# Patient Record
Sex: MALE | Race: ASIAN | NOT HISPANIC OR LATINO | URBAN - METROPOLITAN AREA
[De-identification: names, ages, dates, MRNs, and addresses within clinical notes are randomized per-mention and may not be internally consistent; named-entity substitution may affect disease eponyms.]

---

## 2019-10-14 ENCOUNTER — EMERGENCY (EMERGENCY)
Facility: HOSPITAL | Age: 34
LOS: 1 days | Discharge: HOME | End: 2019-10-14
Attending: EMERGENCY MEDICINE | Admitting: EMERGENCY MEDICINE
Payer: MEDICAID

## 2019-10-14 VITALS
TEMPERATURE: 98 F | SYSTOLIC BLOOD PRESSURE: 116 MMHG | HEART RATE: 98 BPM | RESPIRATION RATE: 18 BRPM | OXYGEN SATURATION: 100 % | DIASTOLIC BLOOD PRESSURE: 79 MMHG

## 2019-10-14 LAB
ALBUMIN SERPL ELPH-MCNC: 4.5 G/DL — SIGNIFICANT CHANGE UP (ref 3.3–5)
ALP SERPL-CCNC: 50 U/L — SIGNIFICANT CHANGE UP (ref 40–120)
ALT FLD-CCNC: 13 U/L — SIGNIFICANT CHANGE UP (ref 4–41)
ANION GAP SERPL CALC-SCNC: 12 MMO/L — SIGNIFICANT CHANGE UP (ref 7–14)
APPEARANCE UR: CLEAR — SIGNIFICANT CHANGE UP
AST SERPL-CCNC: 18 U/L — SIGNIFICANT CHANGE UP (ref 4–40)
BASE EXCESS BLDV CALC-SCNC: 6.1 MMOL/L — SIGNIFICANT CHANGE UP
BASOPHILS # BLD AUTO: 0.04 K/UL — SIGNIFICANT CHANGE UP (ref 0–0.2)
BASOPHILS NFR BLD AUTO: 0.6 % — SIGNIFICANT CHANGE UP (ref 0–2)
BILIRUB SERPL-MCNC: 0.4 MG/DL — SIGNIFICANT CHANGE UP (ref 0.2–1.2)
BILIRUB UR-MCNC: NEGATIVE — SIGNIFICANT CHANGE UP
BLOOD GAS VENOUS - CREATININE: 0.92 MG/DL — SIGNIFICANT CHANGE UP (ref 0.5–1.3)
BLOOD UR QL VISUAL: NEGATIVE — SIGNIFICANT CHANGE UP
BUN SERPL-MCNC: 9 MG/DL — SIGNIFICANT CHANGE UP (ref 7–23)
CALCIUM SERPL-MCNC: 9.5 MG/DL — SIGNIFICANT CHANGE UP (ref 8.4–10.5)
CHLORIDE BLDV-SCNC: 105 MMOL/L — SIGNIFICANT CHANGE UP (ref 96–108)
CHLORIDE SERPL-SCNC: 100 MMOL/L — SIGNIFICANT CHANGE UP (ref 98–107)
CO2 SERPL-SCNC: 28 MMOL/L — SIGNIFICANT CHANGE UP (ref 22–31)
COLOR SPEC: COLORLESS — SIGNIFICANT CHANGE UP
CREAT SERPL-MCNC: 0.99 MG/DL — SIGNIFICANT CHANGE UP (ref 0.5–1.3)
EOSINOPHIL # BLD AUTO: 0.11 K/UL — SIGNIFICANT CHANGE UP (ref 0–0.5)
EOSINOPHIL NFR BLD AUTO: 1.7 % — SIGNIFICANT CHANGE UP (ref 0–6)
GAS PNL BLDV: 135 MMOL/L — LOW (ref 136–146)
GLUCOSE BLDV-MCNC: 89 MG/DL — SIGNIFICANT CHANGE UP (ref 70–99)
GLUCOSE SERPL-MCNC: 94 MG/DL — SIGNIFICANT CHANGE UP (ref 70–99)
GLUCOSE UR-MCNC: NEGATIVE — SIGNIFICANT CHANGE UP
HCO3 BLDV-SCNC: 26 MMOL/L — SIGNIFICANT CHANGE UP (ref 20–27)
HCT VFR BLD CALC: 45.4 % — SIGNIFICANT CHANGE UP (ref 39–50)
HCT VFR BLDV CALC: 53.4 % — HIGH (ref 39–51)
HGB BLD-MCNC: 16.4 G/DL — SIGNIFICANT CHANGE UP (ref 13–17)
HGB BLDV-MCNC: 17.5 G/DL — HIGH (ref 13–17)
IMM GRANULOCYTES NFR BLD AUTO: 0.2 % — SIGNIFICANT CHANGE UP (ref 0–1.5)
KETONES UR-MCNC: NEGATIVE — SIGNIFICANT CHANGE UP
LACTATE BLDV-MCNC: 1 MMOL/L — SIGNIFICANT CHANGE UP (ref 0.5–2)
LEUKOCYTE ESTERASE UR-ACNC: NEGATIVE — SIGNIFICANT CHANGE UP
LIDOCAIN IGE QN: 92.4 U/L — HIGH (ref 7–60)
LYMPHOCYTES # BLD AUTO: 1.58 K/UL — SIGNIFICANT CHANGE UP (ref 1–3.3)
LYMPHOCYTES # BLD AUTO: 24.6 % — SIGNIFICANT CHANGE UP (ref 13–44)
MCHC RBC-ENTMCNC: 31.2 PG — SIGNIFICANT CHANGE UP (ref 27–34)
MCHC RBC-ENTMCNC: 36.1 % — HIGH (ref 32–36)
MCV RBC AUTO: 86.3 FL — SIGNIFICANT CHANGE UP (ref 80–100)
MONOCYTES # BLD AUTO: 0.57 K/UL — SIGNIFICANT CHANGE UP (ref 0–0.9)
MONOCYTES NFR BLD AUTO: 8.9 % — SIGNIFICANT CHANGE UP (ref 2–14)
NEUTROPHILS # BLD AUTO: 4.11 K/UL — SIGNIFICANT CHANGE UP (ref 1.8–7.4)
NEUTROPHILS NFR BLD AUTO: 64 % — SIGNIFICANT CHANGE UP (ref 43–77)
NITRITE UR-MCNC: NEGATIVE — SIGNIFICANT CHANGE UP
NRBC # FLD: 0 K/UL — SIGNIFICANT CHANGE UP (ref 0–0)
PCO2 BLDV: 62 MMHG — HIGH (ref 41–51)
PH BLDV: 7.33 PH — SIGNIFICANT CHANGE UP (ref 7.32–7.43)
PH UR: 6.5 — SIGNIFICANT CHANGE UP (ref 5–8)
PLATELET # BLD AUTO: 216 K/UL — SIGNIFICANT CHANGE UP (ref 150–400)
PMV BLD: 10.8 FL — SIGNIFICANT CHANGE UP (ref 7–13)
PO2 BLDV: 25 MMHG — LOW (ref 35–40)
POTASSIUM BLDV-SCNC: 3.7 MMOL/L — SIGNIFICANT CHANGE UP (ref 3.4–4.5)
POTASSIUM SERPL-MCNC: 3.9 MMOL/L — SIGNIFICANT CHANGE UP (ref 3.5–5.3)
POTASSIUM SERPL-SCNC: 3.9 MMOL/L — SIGNIFICANT CHANGE UP (ref 3.5–5.3)
PROT SERPL-MCNC: 7.6 G/DL — SIGNIFICANT CHANGE UP (ref 6–8.3)
PROT UR-MCNC: NEGATIVE — SIGNIFICANT CHANGE UP
RBC # BLD: 5.26 M/UL — SIGNIFICANT CHANGE UP (ref 4.2–5.8)
RBC # FLD: 12 % — SIGNIFICANT CHANGE UP (ref 10.3–14.5)
SAO2 % BLDV: 38.9 % — LOW (ref 60–85)
SODIUM SERPL-SCNC: 140 MMOL/L — SIGNIFICANT CHANGE UP (ref 135–145)
SP GR SPEC: 1.01 — SIGNIFICANT CHANGE UP (ref 1–1.04)
UROBILINOGEN FLD QL: NORMAL — SIGNIFICANT CHANGE UP
WBC # BLD: 6.42 K/UL — SIGNIFICANT CHANGE UP (ref 3.8–10.5)
WBC # FLD AUTO: 6.42 K/UL — SIGNIFICANT CHANGE UP (ref 3.8–10.5)

## 2019-10-14 PROCEDURE — 99284 EMERGENCY DEPT VISIT MOD MDM: CPT

## 2019-10-14 PROCEDURE — 74177 CT ABD & PELVIS W/CONTRAST: CPT | Mod: 26

## 2019-10-14 RX ORDER — SODIUM CHLORIDE 9 MG/ML
1000 INJECTION INTRAMUSCULAR; INTRAVENOUS; SUBCUTANEOUS ONCE
Refills: 0 | Status: COMPLETED | OUTPATIENT
Start: 2019-10-14 | End: 2019-10-14

## 2019-10-14 RX ORDER — ACETAMINOPHEN 500 MG
650 TABLET ORAL ONCE
Refills: 0 | Status: COMPLETED | OUTPATIENT
Start: 2019-10-14 | End: 2019-10-14

## 2019-10-14 RX ADMIN — SODIUM CHLORIDE 1000 MILLILITER(S): 9 INJECTION INTRAMUSCULAR; INTRAVENOUS; SUBCUTANEOUS at 12:44

## 2019-10-14 RX ADMIN — Medication 650 MILLIGRAM(S): at 12:43

## 2019-10-14 NOTE — ED PROVIDER NOTE - PATIENT PORTAL LINK FT
You can access the FollowMyHealth Patient Portal offered by [facility] by registering at the following website: [email or web address]. By joining [employer]’s FollowMyHealth portal, you will also be able to view your health information using other applications (apps) compatible with our system.

## 2019-10-14 NOTE — ED PROVIDER NOTE - OBJECTIVE STATEMENT
Location:  [facility].  Duration: 2d.  Associated Sx:  no f/c.  Better/worse: no clear modifiers.    Context:  +MHx diverticulitis.

## 2019-10-14 NOTE — ED ADULT NURSE NOTE - OBJECTIVE STATEMENT
Pt presents to ED for left sided abdominal pain. pt AxOx3, ambulatory. Pt states the pain has been going on for 2 days. Pt denies nausea/vomiting/diarrhea. Pt denies dysuria and hematuria. 20G IVL placed in the L AC. meds given as per MD orders. Will continue to monitor.

## 2019-10-14 NOTE — ED PROVIDER NOTE - NSFOLLOWUPINSTRUCTIONS_ED_ALL_ED_FT
1. FOLLOW UP WITH YOUR PRIMARY MD.  2. YOU MAY TAKE IBUPROFEN (MOTRIN, ADVIL) OR ACETAMINOPHEN (TYLENOL) AS NEEDED, AS DIRECTED ON PACKAGING FOR PAIN OR FEVER.  3.  RETURN TO THE ER FOR ANY WORSENING SYMPTOMS OR CONCERNS (FEVER, CHILLS, WORSENING, PAIN, INABILITY TO EAT/DRINK, ETC...) 1. FOLLOW UP WITH YOUR PRIMARY MD.  2. YOU MAY TAKE IBUPROFEN (MOTRIN, ADVIL) AS NEEDED, AS DIRECTED ON PACKAGING FOR PAIN.  3.  TAKE PERCOCET (325/5mg) EVERY 6 HRS, AS NEEDED FOR PAIN.  4.  RETURN TO THE ER IMMEDIATELY FOR ANY WORSENING SYMPTOMS OR CONCERNS (FEVER, CHILLS, WORSENING, PAIN, INABILITY TO EAT/DRINK, ETC...)

## 2019-10-14 NOTE — ED PROVIDER NOTE - PROGRESS NOTE DETAILS
CT = epiploic appendagitis of the descending colon, which would account for his pain.  The intensity is not intractable.  Diagnosis explained to patient.  plan:  d/c home, nsaids, return precautions. CT = epiploic appendagitis of the descending colon, which would account for his pain.  The intensity is not intractable.  Diagnosis explained to patient.  plan:  d/c home, nsaids, Prn percocet, strict return precautions (fever, chills, nausea, intractable pain).

## 2019-10-14 NOTE — ED PROVIDER NOTE - CLINICAL SUMMARY MEDICAL DECISION MAKING FREE TEXT BOX
Impression/Plan:  Adult M with several days LLQ pain, MHx of diverticulitis and LLQ pain that is significant enough to warrant CT abdomen to r/o perf/abscess.  I do not suspect acute cholecystitis, ACS, or vascular etiology of his abdominal pain because the location, quality, and modifiers are inconsistent with these diagnoses.  Patient will be evaluated with labs, ua, CT abdomen, and treated with APAP and IVF (currently refusing stronger pain meds).

## 2019-10-17 ENCOUNTER — EMERGENCY (EMERGENCY)
Facility: HOSPITAL | Age: 34
LOS: 1 days | Discharge: HOME | End: 2019-10-17
Admitting: EMERGENCY MEDICINE
Payer: MEDICAID

## 2019-10-17 VITALS
DIASTOLIC BLOOD PRESSURE: 88 MMHG | TEMPERATURE: 99 F | HEART RATE: 93 BPM | OXYGEN SATURATION: 100 % | SYSTOLIC BLOOD PRESSURE: 133 MMHG | RESPIRATION RATE: 18 BRPM

## 2019-10-17 VITALS
HEART RATE: 83 BPM | DIASTOLIC BLOOD PRESSURE: 73 MMHG | RESPIRATION RATE: 17 BRPM | SYSTOLIC BLOOD PRESSURE: 114 MMHG | OXYGEN SATURATION: 99 % | TEMPERATURE: 99 F

## 2019-10-17 PROBLEM — K57.92 DIVERTICULITIS OF INTESTINE, PART UNSPECIFIED, WITHOUT PERFORATION OR ABSCESS WITHOUT BLEEDING: Chronic | Status: ACTIVE | Noted: 2019-10-14

## 2019-10-17 LAB
ALBUMIN SERPL ELPH-MCNC: 4.3 G/DL — SIGNIFICANT CHANGE UP (ref 3.3–5)
ALP SERPL-CCNC: 48 U/L — SIGNIFICANT CHANGE UP (ref 40–120)
ALT FLD-CCNC: 14 U/L — SIGNIFICANT CHANGE UP (ref 4–41)
ANION GAP SERPL CALC-SCNC: 8 MMO/L — SIGNIFICANT CHANGE UP (ref 7–14)
APPEARANCE UR: CLEAR — SIGNIFICANT CHANGE UP
AST SERPL-CCNC: 20 U/L — SIGNIFICANT CHANGE UP (ref 4–40)
BASE EXCESS BLDV CALC-SCNC: 4.9 MMOL/L — SIGNIFICANT CHANGE UP
BASOPHILS # BLD AUTO: 0.01 K/UL — SIGNIFICANT CHANGE UP (ref 0–0.2)
BASOPHILS NFR BLD AUTO: 0.2 % — SIGNIFICANT CHANGE UP (ref 0–2)
BILIRUB SERPL-MCNC: 0.3 MG/DL — SIGNIFICANT CHANGE UP (ref 0.2–1.2)
BILIRUB UR-MCNC: NEGATIVE — SIGNIFICANT CHANGE UP
BLOOD GAS VENOUS - CREATININE: 0.83 MG/DL — SIGNIFICANT CHANGE UP (ref 0.5–1.3)
BLOOD UR QL VISUAL: NEGATIVE — SIGNIFICANT CHANGE UP
BUN SERPL-MCNC: 9 MG/DL — SIGNIFICANT CHANGE UP (ref 7–23)
CALCIUM SERPL-MCNC: 9.4 MG/DL — SIGNIFICANT CHANGE UP (ref 8.4–10.5)
CHLORIDE BLDV-SCNC: 104 MMOL/L — SIGNIFICANT CHANGE UP (ref 96–108)
CHLORIDE SERPL-SCNC: 102 MMOL/L — SIGNIFICANT CHANGE UP (ref 98–107)
CO2 SERPL-SCNC: 28 MMOL/L — SIGNIFICANT CHANGE UP (ref 22–31)
COLOR SPEC: COLORLESS — SIGNIFICANT CHANGE UP
CREAT SERPL-MCNC: 0.87 MG/DL — SIGNIFICANT CHANGE UP (ref 0.5–1.3)
EOSINOPHIL # BLD AUTO: 0.04 K/UL — SIGNIFICANT CHANGE UP (ref 0–0.5)
EOSINOPHIL NFR BLD AUTO: 0.8 % — SIGNIFICANT CHANGE UP (ref 0–6)
GAS PNL BLDV: 137 MMOL/L — SIGNIFICANT CHANGE UP (ref 136–146)
GLUCOSE BLDV-MCNC: 96 MG/DL — SIGNIFICANT CHANGE UP (ref 70–99)
GLUCOSE SERPL-MCNC: 98 MG/DL — SIGNIFICANT CHANGE UP (ref 70–99)
GLUCOSE UR-MCNC: NEGATIVE — SIGNIFICANT CHANGE UP
HCO3 BLDV-SCNC: 27 MMOL/L — SIGNIFICANT CHANGE UP (ref 20–27)
HCT VFR BLD CALC: 46.5 % — SIGNIFICANT CHANGE UP (ref 39–50)
HCT VFR BLDV CALC: 50.5 % — SIGNIFICANT CHANGE UP (ref 39–51)
HGB BLD-MCNC: 16.5 G/DL — SIGNIFICANT CHANGE UP (ref 13–17)
HGB BLDV-MCNC: 16.5 G/DL — SIGNIFICANT CHANGE UP (ref 13–17)
IMM GRANULOCYTES NFR BLD AUTO: 0.2 % — SIGNIFICANT CHANGE UP (ref 0–1.5)
KETONES UR-MCNC: NEGATIVE — SIGNIFICANT CHANGE UP
LACTATE BLDV-MCNC: 1.7 MMOL/L — SIGNIFICANT CHANGE UP (ref 0.5–2)
LEUKOCYTE ESTERASE UR-ACNC: NEGATIVE — SIGNIFICANT CHANGE UP
LIDOCAIN IGE QN: 36.9 U/L — SIGNIFICANT CHANGE UP (ref 7–60)
LYMPHOCYTES # BLD AUTO: 1.05 K/UL — SIGNIFICANT CHANGE UP (ref 1–3.3)
LYMPHOCYTES # BLD AUTO: 20.4 % — SIGNIFICANT CHANGE UP (ref 13–44)
MCHC RBC-ENTMCNC: 30.6 PG — SIGNIFICANT CHANGE UP (ref 27–34)
MCHC RBC-ENTMCNC: 35.5 % — SIGNIFICANT CHANGE UP (ref 32–36)
MCV RBC AUTO: 86.3 FL — SIGNIFICANT CHANGE UP (ref 80–100)
MONOCYTES # BLD AUTO: 0.26 K/UL — SIGNIFICANT CHANGE UP (ref 0–0.9)
MONOCYTES NFR BLD AUTO: 5 % — SIGNIFICANT CHANGE UP (ref 2–14)
NEUTROPHILS # BLD AUTO: 3.78 K/UL — SIGNIFICANT CHANGE UP (ref 1.8–7.4)
NEUTROPHILS NFR BLD AUTO: 73.4 % — SIGNIFICANT CHANGE UP (ref 43–77)
NITRITE UR-MCNC: NEGATIVE — SIGNIFICANT CHANGE UP
NRBC # FLD: 0 K/UL — SIGNIFICANT CHANGE UP (ref 0–0)
PCO2 BLDV: 53 MMHG — HIGH (ref 41–51)
PH BLDV: 7.37 PH — SIGNIFICANT CHANGE UP (ref 7.32–7.43)
PH UR: 7.5 — SIGNIFICANT CHANGE UP (ref 5–8)
PLATELET # BLD AUTO: 193 K/UL — SIGNIFICANT CHANGE UP (ref 150–400)
PMV BLD: 11.1 FL — SIGNIFICANT CHANGE UP (ref 7–13)
PO2 BLDV: 36 MMHG — SIGNIFICANT CHANGE UP (ref 35–40)
POTASSIUM BLDV-SCNC: 3.6 MMOL/L — SIGNIFICANT CHANGE UP (ref 3.4–4.5)
POTASSIUM SERPL-MCNC: 3.9 MMOL/L — SIGNIFICANT CHANGE UP (ref 3.5–5.3)
POTASSIUM SERPL-SCNC: 3.9 MMOL/L — SIGNIFICANT CHANGE UP (ref 3.5–5.3)
PROT SERPL-MCNC: 7.5 G/DL — SIGNIFICANT CHANGE UP (ref 6–8.3)
PROT UR-MCNC: NEGATIVE — SIGNIFICANT CHANGE UP
RBC # BLD: 5.39 M/UL — SIGNIFICANT CHANGE UP (ref 4.2–5.8)
RBC # FLD: 11.9 % — SIGNIFICANT CHANGE UP (ref 10.3–14.5)
SAO2 % BLDV: 66 % — SIGNIFICANT CHANGE UP (ref 60–85)
SODIUM SERPL-SCNC: 138 MMOL/L — SIGNIFICANT CHANGE UP (ref 135–145)
SP GR SPEC: 1.01 — SIGNIFICANT CHANGE UP (ref 1–1.04)
UROBILINOGEN FLD QL: NORMAL — SIGNIFICANT CHANGE UP
WBC # BLD: 5.15 K/UL — SIGNIFICANT CHANGE UP (ref 3.8–10.5)
WBC # FLD AUTO: 5.15 K/UL — SIGNIFICANT CHANGE UP (ref 3.8–10.5)

## 2019-10-17 PROCEDURE — 99284 EMERGENCY DEPT VISIT MOD MDM: CPT

## 2019-10-17 RX ORDER — SODIUM CHLORIDE 9 MG/ML
1000 INJECTION INTRAMUSCULAR; INTRAVENOUS; SUBCUTANEOUS ONCE
Refills: 0 | Status: COMPLETED | OUTPATIENT
Start: 2019-10-17 | End: 2019-10-17

## 2019-10-17 RX ORDER — OXYCODONE AND ACETAMINOPHEN 5; 325 MG/1; MG/1
1 TABLET ORAL ONCE
Refills: 0 | Status: DISCONTINUED | OUTPATIENT
Start: 2019-10-17 | End: 2019-10-17

## 2019-10-17 RX ORDER — KETOROLAC TROMETHAMINE 30 MG/ML
30 SYRINGE (ML) INJECTION ONCE
Refills: 0 | Status: DISCONTINUED | OUTPATIENT
Start: 2019-10-17 | End: 2019-10-17

## 2019-10-17 RX ORDER — IBUPROFEN 200 MG
1 TABLET ORAL
Qty: 40 | Refills: 0
Start: 2019-10-17

## 2019-10-17 RX ORDER — LORATADINE 10 MG/1
1 TABLET ORAL
Qty: 30 | Refills: 0
Start: 2019-10-17

## 2019-10-17 RX ADMIN — SODIUM CHLORIDE 1000 MILLILITER(S): 9 INJECTION INTRAMUSCULAR; INTRAVENOUS; SUBCUTANEOUS at 14:51

## 2019-10-17 RX ADMIN — OXYCODONE AND ACETAMINOPHEN 1 TABLET(S): 5; 325 TABLET ORAL at 16:25

## 2019-10-17 RX ADMIN — Medication 30 MILLIGRAM(S): at 14:51

## 2019-10-17 NOTE — ED PROVIDER NOTE - PROGRESS NOTE DETAILS
PA smartt: on reassessment patient reported feeling better. he was advised on symptoms. return precautions were discussed with patient

## 2019-10-17 NOTE — ED PROVIDER NOTE - NSFOLLOWUPINSTRUCTIONS_ED_ALL_ED_FT
Please take medication as prescribed. If symptoms worsen or do not improve, fever, chills, nausea, vomiting  please return to ED.

## 2019-10-17 NOTE — ED PROVIDER NOTE - PATIENT PORTAL LINK FT
You can access the FollowMyHealth Patient Portal offered by [facility] by registering at the following website: [email or web address]. By joining [employer]’s FollowMyHealth portal, you will also be able to view your health information using other applications (apps) compatible with our system.

## 2019-10-17 NOTE — ED PROVIDER NOTE - OBJECTIVE STATEMENT
Patient is a 35 y/o M recently dx with epiplogic appendagitis of the descending colon 3 days ago returns to the ED with c/o continued pain and subjective fever. He states that pain has never completely resolved, that he has experienced pain since d/c but it increased in intensity today. No associated n/v/d/c, blood in stool, HA, dizziness, dysuria, hematuria.

## 2019-10-17 NOTE — ED PROVIDER NOTE - GASTROINTESTINAL, MLM
+ tenderness to the left flank/ lateral aspect of LUQ Abdominal soft, no rash, guarding, rigidness, rebound tenderness

## 2019-10-17 NOTE — ED ADULT NURSE NOTE - OBJECTIVE STATEMENT
break coverage RNL: pt received to intake #3 with c/o L flank and chills at home. pt warm to touch. denies urinary complaints, cp, sob, n/v/d. IV placed, labs drawn and sent. medication given as per MDs orders. pt to go to RW.

## 2019-10-17 NOTE — ED ADULT NURSE NOTE - NSIMPLEMENTINTERV_GEN_ALL_ED
Implemented All Universal Safety Interventions:  Guys to call system. Call bell, personal items and telephone within reach. Instruct patient to call for assistance. Room bathroom lighting operational. Non-slip footwear when patient is off stretcher. Physically safe environment: no spills, clutter or unnecessary equipment. Stretcher in lowest position, wheels locked, appropriate side rails in place.

## 2019-10-17 NOTE — ED ADULT NURSE REASSESSMENT NOTE - COMFORT CARE
instructions given not to drive or drink alcohol after receiving percocet, states he will get friend to drive him home

## 2019-10-17 NOTE — ED PROVIDER NOTE - CLINICAL SUMMARY MEDICAL DECISION MAKING FREE TEXT BOX
35 y/o M recently diagnosed with epiploic appendagitis returns to the ED with worsening left flank pain and subjective fever chills. patient afebrile at triage. PE unremarkable for surgical abdomen. plan is for routine labs, analgesic and reassessment

## 2019-10-18 LAB
SPECIMEN SOURCE: SIGNIFICANT CHANGE UP
SPECIMEN SOURCE: SIGNIFICANT CHANGE UP

## 2019-10-18 NOTE — ED POST DISCHARGE NOTE - RESULT SUMMARY
US Tape review- educational ultrasound reviewed.  Gallstone vs gallbladder polyp seen; no sign of cholecystitis.  Pt contacted at [phone] and informed as such, advised to f/u with PMD and arrange f/u US in 6 mos.  Pt provided read back confirmation of understanding.

## 2019-10-22 LAB
BACTERIA BLD CULT: SIGNIFICANT CHANGE UP
BACTERIA BLD CULT: SIGNIFICANT CHANGE UP

## 2020-03-24 ENCOUNTER — EMERGENCY (EMERGENCY)
Facility: HOSPITAL | Age: 35
LOS: 1 days | Discharge: HOME | End: 2020-03-24
Attending: EMERGENCY MEDICINE | Admitting: EMERGENCY MEDICINE
Payer: MEDICAID

## 2020-03-24 VITALS
SYSTOLIC BLOOD PRESSURE: 143 MMHG | WEIGHT: 167.99 LBS | OXYGEN SATURATION: 98 % | DIASTOLIC BLOOD PRESSURE: 87 MMHG | TEMPERATURE: 98 F | HEART RATE: 104 BPM | RESPIRATION RATE: 16 BRPM

## 2020-03-24 VITALS
HEART RATE: 92 BPM | SYSTOLIC BLOOD PRESSURE: 116 MMHG | OXYGEN SATURATION: 100 % | DIASTOLIC BLOOD PRESSURE: 62 MMHG | TEMPERATURE: 99 F | RESPIRATION RATE: 18 BRPM

## 2020-03-24 DIAGNOSIS — B34.9 VIRAL INFECTION, UNSPECIFIED: ICD-10-CM

## 2020-03-24 DIAGNOSIS — Z20.828 CONTACT WITH AND (SUSPECTED) EXPOSURE TO OTHER VIRAL COMMUNICABLE DISEASES: ICD-10-CM

## 2020-03-24 DIAGNOSIS — R05 COUGH: ICD-10-CM

## 2020-03-24 LAB — RAPID RVP RESULT: SIGNIFICANT CHANGE UP

## 2020-03-24 PROCEDURE — 71046 X-RAY EXAM CHEST 2 VIEWS: CPT | Mod: 26

## 2020-03-24 PROCEDURE — 99284 EMERGENCY DEPT VISIT MOD MDM: CPT

## 2020-03-24 RX ORDER — ALBUTEROL 90 UG/1
2 AEROSOL, METERED ORAL ONCE
Refills: 0 | Status: COMPLETED | OUTPATIENT
Start: 2020-03-24 | End: 2020-03-24

## 2020-03-24 RX ORDER — ACETAMINOPHEN 500 MG
2 TABLET ORAL
Qty: 30 | Refills: 0
Start: 2020-03-24

## 2020-03-24 RX ORDER — ALBUTEROL 90 UG/1
2 AEROSOL, METERED ORAL
Qty: 1 | Refills: 0
Start: 2020-03-24 | End: 2020-04-22

## 2020-03-24 RX ORDER — AZITHROMYCIN 500 MG/1
1 TABLET, FILM COATED ORAL
Qty: 6 | Refills: 0
Start: 2020-03-24 | End: 2020-03-28

## 2020-03-24 RX ORDER — ACETAMINOPHEN 500 MG
975 TABLET ORAL ONCE
Refills: 0 | Status: COMPLETED | OUTPATIENT
Start: 2020-03-24 | End: 2020-03-24

## 2020-03-24 RX ADMIN — Medication 975 MILLIGRAM(S): at 16:21

## 2020-03-24 RX ADMIN — ALBUTEROL 2 PUFF(S): 90 AEROSOL, METERED ORAL at 16:22

## 2020-03-24 RX ADMIN — Medication 0.25 MILLIGRAM(S): at 16:22

## 2020-03-24 NOTE — ED PROVIDER NOTE - RESPIRATORY, MLM
Breath sounds clear and equal bilaterally. Slightly decreased air entry bilaterally with expiratory wheezing associated with cough. Slightly decreased air entry bilaterally with expiratory wheezing associated with cough.

## 2020-03-24 NOTE — ED PROVIDER NOTE - CLINICAL SUMMARY MEDICAL DECISION MAKING FREE TEXT BOX
Pt presenting with symptoms consistent with COVID-19. Seems stable for discharge, has wheezing. Will check CXR and treat with azithromycin, albuterol, and terbutaline. Spoke with patient regarding COVID testing. Patient verbally agreed to receiving results electronically. Signature not done 2/2 infection risk. Pt presenting with symptoms consistent with COVID-19. Seems stable for discharge, but has wheezing on exam. Will check CXR and treat with Azithromycin, Albuterol, and Terbutaline. Spoke with patient regarding COVID testing. Patient verbally agreed to receiving results electronically. Signature not done 2/2 infection risk.

## 2020-03-24 NOTE — ED PROVIDER NOTE - NSDCPRINTRESULTS_ED_ALL_ED
Resp. monitored floor bed
Patient requests all Lab and Radiology Results on their Discharge Instructions

## 2020-03-24 NOTE — ED PROVIDER NOTE - NSFOLLOWUPINSTRUCTIONS_ED_ALL_ED_FT
You likely have Coronavirus, as well as asthma triggered by the infection.     COVID-19 testing are currently being prioritized at [facility] for admitted patients.     All patients that are stable are being discharged from the ED, even if there is a concern for coronavirus. Since you are stable, you are being discharged. Your test results may take 5-7 days. You will get a text message or email for positive results. Not for negative results.  Please check the patient online portal for results. Please follow the instructions on provided coronavirus discharge educational forms and self quarantine for 14 days.     In addition, you have been placed on our surveillance tracker. Return to the ED immediately if you have shortness of breath, fever, pain, weakness, vomiting any concerns.    1. STAY HOME for 14 DAYS  2. Minimize Human contact to ONLY ESSENTIAL  3. Every time you wash your hands, sing the HAPPY BIRTHDAY Song so you know you're washing long enough.  Make sure to scrub the webspace between your fingers.  4. DRINK 1-3 Liters of fluids day x at least 5 days.  To remain hydrated. Your fatigue, lightheadedness, and body aches will decrease and your fever has a better chance of breaking if you are well hydrated.    5. For your Fever and Body aches takes Tylenol 650-100mg every 4-6h (max 4000mg/day). Try not to use ibuprofen, aspirin or naproxen (Advil, Motrin or Aleve) as these may worsen Coronavirus infection.  6. Use an inhaler for mild shortness of breath and cough  7. RETURN TO THE ER IMMEDIATELY IF YOU HAVE WORSENING SHORTNESS OF BREATH

## 2020-03-24 NOTE — ED ADULT TRIAGE NOTE - CHIEF COMPLAINT QUOTE
Patient c/p chest pressure, mild cough and feeling feverish. Pt room was exposed to someone who tested positive for COVID-19

## 2020-03-24 NOTE — ED PROVIDER NOTE - OBJECTIVE STATEMENT
34 y/o male with PMHx of asthma presents to ED c/o cough x4-5 days, diarrhea and fever x1 day, and difficulty breathing. Pt reports he took Tylenol this morning and had contact with someone who tested + for COVID-19. 34 y/o male with PMHx of asthma presents to ED c/o cough x 4-5 days. Patient reports diarrhea yesterday as well as fever. Also endorses difficulty breathing. Denies any CP, abd pain, nausea, or vomiting. Reports contact with someone who tested + for COVID-19. Patient has been taking Tylenol (last dose this morning).

## 2020-03-24 NOTE — ED PROVIDER NOTE - PATIENT PORTAL LINK FT
You can access the FollowMyHealth Patient Portal offered by [facility] by registering at the following website: [email or web address]. By joining [employer]’s FollowMyHealth portal, you will also be able to view your health information using other applications (apps) compatible with our system.

## 2020-03-25 LAB — SARS-COV-2 RNA SPEC QL NAA+PROBE: DETECTED

## 2020-05-01 ENCOUNTER — APPOINTMENT (OUTPATIENT)
Dept: DISASTER EMERGENCY | Facility: CLINIC | Age: 35
End: 2020-05-01
Payer: COMMERCIAL

## 2020-05-01 VITALS
TEMPERATURE: 98.8 F | RESPIRATION RATE: 14 BRPM | SYSTOLIC BLOOD PRESSURE: 110 MMHG | HEART RATE: 90 BPM | OXYGEN SATURATION: 98 % | DIASTOLIC BLOOD PRESSURE: 68 MMHG

## 2020-05-01 VITALS — HEIGHT: 70 IN | BODY MASS INDEX: 24.05 KG/M2 | WEIGHT: 168 LBS

## 2020-05-01 DIAGNOSIS — Z20.828 CONTACT WITH AND (SUSPECTED) EXPOSURE TO OTHER VIRAL COMMUNICABLE DISEASES: ICD-10-CM

## 2020-05-01 DIAGNOSIS — U07.1 COVID-19: ICD-10-CM

## 2020-05-01 DIAGNOSIS — Z87.09 PERSONAL HISTORY OF OTHER DISEASES OF THE RESPIRATORY SYSTEM: ICD-10-CM

## 2020-05-01 PROBLEM — Z00.00 ENCOUNTER FOR PREVENTIVE HEALTH EXAMINATION: Status: ACTIVE | Noted: 2020-05-01

## 2020-05-01 PROBLEM — J45.909 UNSPECIFIED ASTHMA, UNCOMPLICATED: Chronic | Status: ACTIVE | Noted: 2020-03-24

## 2020-05-01 PROCEDURE — 99213 OFFICE O/P EST LOW 20 MIN: CPT

## 2020-05-01 NOTE — HISTORY OF PRESENT ILLNESS
[Patient presents to the office today for COVID-19 evaluation and testing.] : Patient presents to the office today for COVID-19 evaluation and testing. [Patient has been pre-screened by RN at call center for appointment today with our facility.] : Patient has been pre-screened by RN at call center for appointment today with our facility. 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, Fire Dept, or Police [Lung Disease] : lung disease [None] : none [Clear] : clear [Good Air Entry] : good air entry [Speaks in full sentences] : speaks in full sentences [Normal O2 sat at rest] : normal O2 sat at rest [Grossly normal, interacts, not tired or weak] : grossly normal, interacts, not tired or weak [COVID-19 testing ordered and specimen obtained] : COVID-19 testing ordered and specimen obtained [Discharged with current Quarantine instructions and advised of signs of worsening illness.] : Patient discharged with current quarantine instructions and advised of signs of worsening illness. Patient told to seek emergent care if symptoms occur. [FreeTextEntry1] : Katie is a 35 year old male with complaints of fever (TMax 100.5 F), chills, headache, chest tightness and nausea x 1 week. He was seen at a [facility] and started on two antibiotics (patient unable to recall medications).  His roommate has been sick but was not tested for COVID.  He works for [employer].   [TextBox_107] : \par Patient education provided for COVID-19. Explained if symptoms such as SOB or fever progress, patient instructed to go to ED.  Discussed isolation precautions and handwashing techniques. Social distancing reviewed. Utilize Tylenol for fever >101. No signs of cardiovascular decompensation. Patient verbalized understanding of provided instructions. Tests results may take up to 3-7 days to result. Discussed possibility of false negative results. Instructed to self quarantine and must remain quarantined x 14 days and no fever for three days without antipyretic medication.  All patient close contacts should also self quarantine. Social distancing once quarantine is completed. If patient has symptoms of chest discomfort, severe SOB at rest, worsening shortness of breath with minimal exertion, new or worsening wheezing, dizziness were instructed to seek immediate medical evaluation. \par \par COVID-19 nasal swab preformed and ordered in office today. Advised test results may take 3-7 days to return. Discussed diagnostic accuracy and possibility of false negative results. Instructed to self quarantine and must remain quarantined x 14 days and no fever for 3 days without antifever medications.  All patients close contacts should also self quarantine.  Close contacts with comorbidities at higher risk of COVID disease.  Social distancing once quarantine is completed.  If high risk symptoms of chest discomfort, severe shortness of breath at rest, worsening shortness of breath with minimal exertion, new or worsening wheezing, dizziness then instructed to seek immediate medical evaluation.  A letter for work and patient education form was provided.  The above plan was reviewed with the patient.  All questions have been answered.  Instructed to call if any new symptoms\par  \par \par \par

## 2020-05-02 LAB — SARS-COV-2 N GENE NPH QL NAA+PROBE: NOT DETECTED

## 2020-05-11 ENCOUNTER — EMERGENCY (EMERGENCY)
Facility: HOSPITAL | Age: 35
LOS: 1 days | Discharge: HOME | End: 2020-05-11
Attending: EMERGENCY MEDICINE | Admitting: EMERGENCY MEDICINE
Payer: MEDICAID

## 2020-05-11 VITALS
RESPIRATION RATE: 16 BRPM | OXYGEN SATURATION: 100 % | SYSTOLIC BLOOD PRESSURE: 127 MMHG | DIASTOLIC BLOOD PRESSURE: 80 MMHG | TEMPERATURE: 99 F | HEART RATE: 97 BPM

## 2020-05-11 VITALS
RESPIRATION RATE: 20 BRPM | TEMPERATURE: 99 F | SYSTOLIC BLOOD PRESSURE: 132 MMHG | HEART RATE: 90 BPM | OXYGEN SATURATION: 100 % | DIASTOLIC BLOOD PRESSURE: 87 MMHG

## 2020-05-11 LAB
ALBUMIN SERPL ELPH-MCNC: 4.6 G/DL — SIGNIFICANT CHANGE UP (ref 3.3–5)
ALP SERPL-CCNC: 49 U/L — SIGNIFICANT CHANGE UP (ref 40–120)
ALT FLD-CCNC: 25 U/L — SIGNIFICANT CHANGE UP (ref 4–41)
ANION GAP SERPL CALC-SCNC: 12 MMO/L — SIGNIFICANT CHANGE UP (ref 7–14)
APPEARANCE UR: CLEAR — SIGNIFICANT CHANGE UP
APTT BLD: 30.1 SEC — SIGNIFICANT CHANGE UP (ref 27.5–36.3)
AST SERPL-CCNC: 26 U/L — SIGNIFICANT CHANGE UP (ref 4–40)
BASE EXCESS BLDV CALC-SCNC: 2.6 MMOL/L — SIGNIFICANT CHANGE UP
BASOPHILS # BLD AUTO: 0.01 K/UL — SIGNIFICANT CHANGE UP (ref 0–0.2)
BASOPHILS NFR BLD AUTO: 0.2 % — SIGNIFICANT CHANGE UP (ref 0–2)
BILIRUB SERPL-MCNC: 0.3 MG/DL — SIGNIFICANT CHANGE UP (ref 0.2–1.2)
BILIRUB UR-MCNC: NEGATIVE — SIGNIFICANT CHANGE UP
BLOOD GAS VENOUS - CREATININE: 0.94 MG/DL — SIGNIFICANT CHANGE UP (ref 0.5–1.3)
BLOOD GAS VENOUS - FIO2: 21 — SIGNIFICANT CHANGE UP
BLOOD UR QL VISUAL: NEGATIVE — SIGNIFICANT CHANGE UP
BUN SERPL-MCNC: 12 MG/DL — SIGNIFICANT CHANGE UP (ref 7–23)
CALCIUM SERPL-MCNC: 9.7 MG/DL — SIGNIFICANT CHANGE UP (ref 8.4–10.5)
CHLORIDE BLDV-SCNC: 105 MMOL/L — SIGNIFICANT CHANGE UP (ref 96–108)
CHLORIDE SERPL-SCNC: 102 MMOL/L — SIGNIFICANT CHANGE UP (ref 98–107)
CO2 SERPL-SCNC: 26 MMOL/L — SIGNIFICANT CHANGE UP (ref 22–31)
COLOR SPEC: COLORLESS — SIGNIFICANT CHANGE UP
CREAT SERPL-MCNC: 0.93 MG/DL — SIGNIFICANT CHANGE UP (ref 0.5–1.3)
EOSINOPHIL # BLD AUTO: 0.13 K/UL — SIGNIFICANT CHANGE UP (ref 0–0.5)
EOSINOPHIL NFR BLD AUTO: 2.1 % — SIGNIFICANT CHANGE UP (ref 0–6)
GAS PNL BLDV: 140 MMOL/L — SIGNIFICANT CHANGE UP (ref 136–146)
GLUCOSE BLDV-MCNC: 135 MG/DL — HIGH (ref 70–99)
GLUCOSE SERPL-MCNC: 130 MG/DL — HIGH (ref 70–99)
GLUCOSE UR-MCNC: NEGATIVE — SIGNIFICANT CHANGE UP
HCO3 BLDV-SCNC: 25 MMOL/L — SIGNIFICANT CHANGE UP (ref 20–27)
HCT VFR BLD CALC: 46.2 % — SIGNIFICANT CHANGE UP (ref 39–50)
HCT VFR BLDV CALC: 51.5 % — HIGH (ref 39–51)
HGB BLD-MCNC: 16.6 G/DL — SIGNIFICANT CHANGE UP (ref 13–17)
HGB BLDV-MCNC: 16.8 G/DL — SIGNIFICANT CHANGE UP (ref 13–17)
IMM GRANULOCYTES NFR BLD AUTO: 0.5 % — SIGNIFICANT CHANGE UP (ref 0–1.5)
INR BLD: 0.98 — SIGNIFICANT CHANGE UP (ref 0.88–1.17)
KETONES UR-MCNC: NEGATIVE — SIGNIFICANT CHANGE UP
LACTATE BLDV-MCNC: 2 MMOL/L — SIGNIFICANT CHANGE UP (ref 0.5–2)
LEUKOCYTE ESTERASE UR-ACNC: NEGATIVE — SIGNIFICANT CHANGE UP
LYMPHOCYTES # BLD AUTO: 1.55 K/UL — SIGNIFICANT CHANGE UP (ref 1–3.3)
LYMPHOCYTES # BLD AUTO: 24.6 % — SIGNIFICANT CHANGE UP (ref 13–44)
MAGNESIUM SERPL-MCNC: 1.9 MG/DL — SIGNIFICANT CHANGE UP (ref 1.6–2.6)
MCHC RBC-ENTMCNC: 31.1 PG — SIGNIFICANT CHANGE UP (ref 27–34)
MCHC RBC-ENTMCNC: 35.9 % — SIGNIFICANT CHANGE UP (ref 32–36)
MCV RBC AUTO: 86.7 FL — SIGNIFICANT CHANGE UP (ref 80–100)
MONOCYTES # BLD AUTO: 0.25 K/UL — SIGNIFICANT CHANGE UP (ref 0–0.9)
MONOCYTES NFR BLD AUTO: 4 % — SIGNIFICANT CHANGE UP (ref 2–14)
NEUTROPHILS # BLD AUTO: 4.32 K/UL — SIGNIFICANT CHANGE UP (ref 1.8–7.4)
NEUTROPHILS NFR BLD AUTO: 68.6 % — SIGNIFICANT CHANGE UP (ref 43–77)
NITRITE UR-MCNC: NEGATIVE — SIGNIFICANT CHANGE UP
NRBC # FLD: 0 K/UL — SIGNIFICANT CHANGE UP (ref 0–0)
PCO2 BLDV: 51 MMHG — SIGNIFICANT CHANGE UP (ref 41–51)
PH BLDV: 7.35 PH — SIGNIFICANT CHANGE UP (ref 7.32–7.43)
PH UR: 6.5 — SIGNIFICANT CHANGE UP (ref 5–8)
PHOSPHATE SERPL-MCNC: 2.6 MG/DL — SIGNIFICANT CHANGE UP (ref 2.5–4.5)
PLATELET # BLD AUTO: 236 K/UL — SIGNIFICANT CHANGE UP (ref 150–400)
PMV BLD: 11.4 FL — SIGNIFICANT CHANGE UP (ref 7–13)
PO2 BLDV: 40 MMHG — SIGNIFICANT CHANGE UP (ref 35–40)
POTASSIUM BLDV-SCNC: 3.9 MMOL/L — SIGNIFICANT CHANGE UP (ref 3.4–4.5)
POTASSIUM SERPL-MCNC: 4 MMOL/L — SIGNIFICANT CHANGE UP (ref 3.5–5.3)
POTASSIUM SERPL-SCNC: 4 MMOL/L — SIGNIFICANT CHANGE UP (ref 3.5–5.3)
PROT SERPL-MCNC: 7.9 G/DL — SIGNIFICANT CHANGE UP (ref 6–8.3)
PROT UR-MCNC: NEGATIVE — SIGNIFICANT CHANGE UP
PROTHROM AB SERPL-ACNC: 11.3 SEC — SIGNIFICANT CHANGE UP (ref 9.8–13.1)
RBC # BLD: 5.33 M/UL — SIGNIFICANT CHANGE UP (ref 4.2–5.8)
RBC # FLD: 12.1 % — SIGNIFICANT CHANGE UP (ref 10.3–14.5)
SAO2 % BLDV: 70.3 % — SIGNIFICANT CHANGE UP (ref 60–85)
SODIUM SERPL-SCNC: 140 MMOL/L — SIGNIFICANT CHANGE UP (ref 135–145)
SP GR SPEC: 1 — LOW (ref 1–1.04)
UROBILINOGEN FLD QL: NORMAL — SIGNIFICANT CHANGE UP
WBC # BLD: 6.29 K/UL — SIGNIFICANT CHANGE UP (ref 3.8–10.5)
WBC # FLD AUTO: 6.29 K/UL — SIGNIFICANT CHANGE UP (ref 3.8–10.5)

## 2020-05-11 PROCEDURE — 99284 EMERGENCY DEPT VISIT MOD MDM: CPT

## 2020-05-11 PROCEDURE — 74177 CT ABD & PELVIS W/CONTRAST: CPT | Mod: 26

## 2020-05-11 RX ORDER — IBUPROFEN 200 MG
400 TABLET ORAL ONCE
Refills: 0 | Status: COMPLETED | OUTPATIENT
Start: 2020-05-11 | End: 2020-05-11

## 2020-05-11 RX ORDER — MULTIVIT WITH MIN/MFOLATE/K2 340-15/3 G
1 POWDER (GRAM) ORAL ONCE
Refills: 0 | Status: COMPLETED | OUTPATIENT
Start: 2020-05-11 | End: 2020-05-11

## 2020-05-11 RX ADMIN — Medication 1 BOTTLE: at 14:04

## 2020-05-11 RX ADMIN — Medication 400 MILLIGRAM(S): at 09:08

## 2020-05-11 RX ADMIN — Medication 30 MILLILITER(S): at 13:36

## 2020-05-11 NOTE — ED PROVIDER NOTE - PROGRESS NOTE DETAILS
Saw EM/IM PGY2: CT with no emergent findings. Pt reports pain improved however "rumbling" of LUQ persists. Has not had BM for 2 days. Given mag citrate and will d/c with strict return precautions and GI follow-up. Pt verbalized understanding of and agree with these results and plan.

## 2020-05-11 NOTE — ED PROVIDER NOTE - NS ED ROS FT
Constitutional: no fever and no chills  Eyes: no discharge, no irritation, no pain, no visual changes  ENMT: no ear pain or hearing loss, no dysphagia or throat pain  Neck: no pain, no stiffness, no swollen glands  CV: no chest pain, no palpitations, no edema  Resp: no cough, no shortness of breath  Abd: (+) abdominal pain, (+) nausea, no vomiting, (+) diarrhea  : (+) dysuria, no hematuria  MSK: no back pain, no neck pain, no joint pain  Neuro: no LOC, no gait abnormality, no headache, no sensory deficits, no weakness  Skin: no rashes, no lacerations, no lesions

## 2020-05-11 NOTE — ED PROVIDER NOTE - PHYSICAL EXAMINATION
PHYSICAL EXAM:  GENERAL: Sitting in stretcher appears uncomfortable 2/2 pain, in no acute distress  HENMT: Atraumatic, moist mucous membranes, no oropharyngeal exudates or vesicles, uvula is midline  EYES: Clear bilaterally, PERRL, EOMs intact b/l  HEART: RRR, S1/S2, no murmurs noted  RESPIRATORY: Clear to auscultation bilaterally, no wheezes/rhonchi/rales  ABDOMEN: LLQ tenderness to deep palpation, otherwise soft, nondistended, no rebound or guarding  EXTREMITIES: No lower extremity edema, +2 radial pulses b/l  NEURO:  A&Ox4, no focal motor deficits or sensory deficits   Heme/LYMPH: No ecchymosis or bruising, no anterior/posterior cervical or supraclavicular LAD  SKIN:  Skin normal color for race, warm, dry and intact. No evidence of rash.

## 2020-05-11 NOTE — ED ADULT NURSE NOTE - OBJECTIVE STATEMENT
pt received at intake rm 1 AAO x 3. pt reports left sided abdominal pain x 1 week associated with nausea, diarrhea and constipation. pt denies sob, chest pain, vomiting, fevers, chills, cough. respirations even and unlabored. 20g iv placed to left ac. labs drawn and sent.

## 2020-05-11 NOTE — ED PROVIDER NOTE - OBJECTIVE STATEMENT
34 y/o M with PMH epiplogic appendagitis, asthma presenting with 1 week of gradual onset waxing and waning L-sided abd/LLQ pain which has become constant pain in the last 2 days a/w intermittent RLQ pain. Associated with nausea, no vomiting, and 1 week of intermittent dysuria. Also 1 week of intermittent diarrhea intermixed with constipation, yesterday 4-5 episodes of watery brown diarrhea without blood, constipated this AM. Denies any fevers/chills, chest pain, SOB, cough, hematuria, joint pain/swelling, or rashes. Notes he was dx with COVID-19 in March, with repeat negative swabs recently, was treated with course of azithromycin/cefpodoxime which he completed yesterday for pna seen on CXR. Hx of epiploic appendagitis ~2 years ago treated with supportive mgmt, no surgical intervention at that time.

## 2020-05-11 NOTE — ED ADULT TRIAGE NOTE - CHIEF COMPLAINT QUOTE
Pt comes in for LLQ pain and flank pain x1 week, worsening in the last two days. Pt reporting being constipated as well with small BM this AM. Pt appears uncomfortable in triage, vs as noted.

## 2020-05-11 NOTE — ED PROVIDER NOTE - CLINICAL SUMMARY MEDICAL DECISION MAKING FREE TEXT BOX
34 y/o M with PMH epiplogic appendagitis, asthma presenting with 1 week of L-sided abd/LLQ pain a/w nausea, dysuria, diarrhea. LLQ tenderness to palpation. Afebrile, vital signs stable. Presentation concerning for recurrence of epiploic appendagitis v diverticulitis, will obtain basic labs, VBG, coags, CT A/P w/ PO/IV contrast, tylenol, no nausea currently, and reevaluate. 36 y/o M with PMH epiplogic appendagitis, asthma presenting with 1 week of L-sided abd/LLQ pain a/w nausea, dysuria, diarrhea. LLQ tenderness to palpation. Afebrile, vital signs stable. Presentation concerning for recurrence of epiploic appendagitis v diverticulitis, will obtain basic labs, VBG, coags, CT A/P w/ PO/IV contrast, tylenol, no nausea currently, and reevaluate.    deidra: pt with left upper abd pain, recent hx of epiploic appendagitis.  no r/g, abd soft, no zoster lesions, ct no findings.  could be epiploic appendigitis but also could be his gastritis (on omeprazole) or stool load.  will refer to outpt GI for f/u.  discussed need for endoscopy

## 2020-05-11 NOTE — ED PROVIDER NOTE - NSFOLLOWUPINSTRUCTIONS_ED_ALL_ED_FT
You were seen in the ER for abdominal pain. We obtained labwork and urine test that showed no evidence of infection. A CT scan of your abdomen showed no evidence of diverticulitis, epiploic appendagitis, or other emergent findings that would explain your symptoms. It is possible that your pain is due to an inflammatory or infectious process that is not present on imaging, but this is less likely given your labwork and imaging results. It is also possible your symptoms are due to constipation as you have not had a bowel movement in two days. We gave you a dose of magnesium citrate in the ER to help you have a bowel movement. You can also take miralax once per day (available over the counter) until you have regular bowel movement. You have also been given a referral for gastroenterology who you should follow-up with within the next week for repeat evaluation and to discuss further possible diagnostic tests if your symptoms persist. Return to the ER for any fevers/chills, severe or worsening abdominal pain, vomiting/inability to eat, lightheadedness/fainting, or any other new or concerning symptoms.

## 2020-05-11 NOTE — ED PROVIDER NOTE - ATTENDING CONTRIBUTION TO CARE
deidra: pt with left upper abd pain, recent hx of epiploic appendagitis.  no r/g, abd soft, no zoster lesions, ct no findings.  could be epiploic appendigitis but also could be his gastritis (on omeprazole) or stool load.  will refer to outpt GI for f/u.  discussed need for endoscopy    I performed a history and physical exam of the patient and discussed their management with the resident and /or advanced care provider. I reviewed the resident and /or ACP's note and agree with the documented findings and plan of care. My medical decison making and observations are found above.    I have supervised and agree with the above resident history, physical and plan with the noted differences.

## 2020-05-11 NOTE — ED PROVIDER NOTE - CARE PLAN
Principal Discharge DX:	Epiploic appendagitis  Secondary Diagnosis:	Left upper quadrant abdominal pain

## 2020-05-11 NOTE — ED PROVIDER NOTE - PATIENT PORTAL LINK FT
You can access the FollowMyHealth Patient Portal offered by [facility] by registering at the following website: [email or web address]. By joining [employer]’s FollowMyHealth portal, you will also be able to view your health information using other applications (apps) compatible with our system.

## 2020-05-11 NOTE — ED PROVIDER NOTE - NSFOLLOWUPCLINICS_GEN_ALL_ED_FT
[facility]  Gastroenterology  [address] [place] [id number]  Phone: [phone]  Fax:   Follow Up Time: 4-6 Days

## 2020-06-18 PROBLEM — U07.1 COVID-19: Status: ACTIVE | Noted: 2020-05-01

## 2021-03-24 NOTE — ED ADULT NURSE NOTE - NS ED NOTE ABUSE SUSPICION NEGLECT YN
Confirmed, thank you so much    
Dr. Negrete patient stopped in office today to schedule vein procedures, in reviewing chart we had attempted to contact her after office visit in December to schedule vein procedures, patient did not return calls, in reviewing chart last vein mapping was completed February 2020 so now is over a year old, repeat vein mapping ordered for patient and scheduled  
No

## 2021-06-28 ENCOUNTER — TRANSCRIPTION ENCOUNTER (OUTPATIENT)
Age: 36
End: 2021-06-28

## 2021-06-28 ENCOUNTER — EMERGENCY (EMERGENCY)
Facility: HOSPITAL | Age: 36
LOS: 1 days | Discharge: HOME | End: 2021-06-28
Attending: EMERGENCY MEDICINE | Admitting: EMERGENCY MEDICINE
Payer: MEDICAID

## 2021-06-28 VITALS
TEMPERATURE: 98 F | HEART RATE: 85 BPM | OXYGEN SATURATION: 99 % | SYSTOLIC BLOOD PRESSURE: 128 MMHG | RESPIRATION RATE: 17 BRPM | DIASTOLIC BLOOD PRESSURE: 88 MMHG

## 2021-06-28 DIAGNOSIS — Z98.890 OTHER SPECIFIED POSTPROCEDURAL STATES: Chronic | ICD-10-CM

## 2021-06-28 LAB
ALBUMIN SERPL ELPH-MCNC: 4.4 G/DL — SIGNIFICANT CHANGE UP (ref 3.3–5)
ALP SERPL-CCNC: 55 U/L — SIGNIFICANT CHANGE UP (ref 40–120)
ALT FLD-CCNC: 28 U/L — SIGNIFICANT CHANGE UP (ref 4–41)
ANION GAP SERPL CALC-SCNC: 14 MMOL/L — SIGNIFICANT CHANGE UP (ref 7–14)
APPEARANCE UR: ABNORMAL
AST SERPL-CCNC: 39 U/L — SIGNIFICANT CHANGE UP (ref 4–40)
BASOPHILS # BLD AUTO: 0.02 K/UL — SIGNIFICANT CHANGE UP (ref 0–0.2)
BASOPHILS NFR BLD AUTO: 0.3 % — SIGNIFICANT CHANGE UP (ref 0–2)
BILIRUB SERPL-MCNC: 0.4 MG/DL — SIGNIFICANT CHANGE UP (ref 0.2–1.2)
BILIRUB UR-MCNC: NEGATIVE — SIGNIFICANT CHANGE UP
BUN SERPL-MCNC: 12 MG/DL — SIGNIFICANT CHANGE UP (ref 7–23)
CALCIUM SERPL-MCNC: 9.4 MG/DL — SIGNIFICANT CHANGE UP (ref 8.4–10.5)
CHLORIDE SERPL-SCNC: 101 MMOL/L — SIGNIFICANT CHANGE UP (ref 98–107)
CO2 SERPL-SCNC: 23 MMOL/L — SIGNIFICANT CHANGE UP (ref 22–31)
COLOR SPEC: YELLOW — SIGNIFICANT CHANGE UP
CREAT SERPL-MCNC: 1.03 MG/DL — SIGNIFICANT CHANGE UP (ref 0.5–1.3)
DIFF PNL FLD: NEGATIVE — SIGNIFICANT CHANGE UP
EOSINOPHIL # BLD AUTO: 0.1 K/UL — SIGNIFICANT CHANGE UP (ref 0–0.5)
EOSINOPHIL NFR BLD AUTO: 1.3 % — SIGNIFICANT CHANGE UP (ref 0–6)
GLUCOSE SERPL-MCNC: 95 MG/DL — SIGNIFICANT CHANGE UP (ref 70–99)
GLUCOSE UR QL: NEGATIVE — SIGNIFICANT CHANGE UP
HCT VFR BLD CALC: 47.7 % — SIGNIFICANT CHANGE UP (ref 39–50)
HGB BLD-MCNC: 16.2 G/DL — SIGNIFICANT CHANGE UP (ref 13–17)
IANC: 4.96 K/UL — SIGNIFICANT CHANGE UP (ref 1.5–8.5)
IMM GRANULOCYTES NFR BLD AUTO: 0.3 % — SIGNIFICANT CHANGE UP (ref 0–1.5)
KETONES UR-MCNC: NEGATIVE — SIGNIFICANT CHANGE UP
LEUKOCYTE ESTERASE UR-ACNC: NEGATIVE — SIGNIFICANT CHANGE UP
LIDOCAIN IGE QN: 62 U/L — HIGH (ref 7–60)
LYMPHOCYTES # BLD AUTO: 2.07 K/UL — SIGNIFICANT CHANGE UP (ref 1–3.3)
LYMPHOCYTES # BLD AUTO: 27.2 % — SIGNIFICANT CHANGE UP (ref 13–44)
MCHC RBC-ENTMCNC: 29.9 PG — SIGNIFICANT CHANGE UP (ref 27–34)
MCHC RBC-ENTMCNC: 34 GM/DL — SIGNIFICANT CHANGE UP (ref 32–36)
MCV RBC AUTO: 88 FL — SIGNIFICANT CHANGE UP (ref 80–100)
MONOCYTES # BLD AUTO: 0.43 K/UL — SIGNIFICANT CHANGE UP (ref 0–0.9)
MONOCYTES NFR BLD AUTO: 5.7 % — SIGNIFICANT CHANGE UP (ref 2–14)
NEUTROPHILS # BLD AUTO: 4.96 K/UL — SIGNIFICANT CHANGE UP (ref 1.8–7.4)
NEUTROPHILS NFR BLD AUTO: 65.2 % — SIGNIFICANT CHANGE UP (ref 43–77)
NITRITE UR-MCNC: NEGATIVE — SIGNIFICANT CHANGE UP
NRBC # BLD: 0 /100 WBCS — SIGNIFICANT CHANGE UP
NRBC # FLD: 0 K/UL — SIGNIFICANT CHANGE UP
PH UR: 6.5 — SIGNIFICANT CHANGE UP (ref 5–8)
PLATELET # BLD AUTO: 202 K/UL — SIGNIFICANT CHANGE UP (ref 150–400)
POTASSIUM SERPL-MCNC: 4.8 MMOL/L — SIGNIFICANT CHANGE UP (ref 3.5–5.3)
POTASSIUM SERPL-SCNC: 4.8 MMOL/L — SIGNIFICANT CHANGE UP (ref 3.5–5.3)
PROT SERPL-MCNC: 7.4 G/DL — SIGNIFICANT CHANGE UP (ref 6–8.3)
PROT UR-MCNC: NEGATIVE — SIGNIFICANT CHANGE UP
RBC # BLD: 5.42 M/UL — SIGNIFICANT CHANGE UP (ref 4.2–5.8)
RBC # FLD: 12.1 % — SIGNIFICANT CHANGE UP (ref 10.3–14.5)
SODIUM SERPL-SCNC: 138 MMOL/L — SIGNIFICANT CHANGE UP (ref 135–145)
SP GR SPEC: 1.01 — LOW (ref 1.01–1.02)
UROBILINOGEN FLD QL: SIGNIFICANT CHANGE UP
WBC # BLD: 7.6 K/UL — SIGNIFICANT CHANGE UP (ref 3.8–10.5)
WBC # FLD AUTO: 7.6 K/UL — SIGNIFICANT CHANGE UP (ref 3.8–10.5)

## 2021-06-28 PROCEDURE — 74177 CT ABD & PELVIS W/CONTRAST: CPT | Mod: 26

## 2021-06-28 PROCEDURE — 99284 EMERGENCY DEPT VISIT MOD MDM: CPT

## 2021-06-28 RX ORDER — SODIUM CHLORIDE 9 MG/ML
1000 INJECTION INTRAMUSCULAR; INTRAVENOUS; SUBCUTANEOUS ONCE
Refills: 0 | Status: COMPLETED | OUTPATIENT
Start: 2021-06-28 | End: 2021-06-28

## 2021-06-28 RX ADMIN — SODIUM CHLORIDE 1000 MILLILITER(S): 9 INJECTION INTRAMUSCULAR; INTRAVENOUS; SUBCUTANEOUS at 14:46

## 2021-06-28 NOTE — ED PROVIDER NOTE - PHYSICAL EXAMINATION
Gen: Awake, Alert, WD, WN, NAD  Head:  NC/AT  Eyes:  PERRL, EOMI, Conjunctiva pink, lids normal, no scleral icterus  Neck: supple, nontender, no meningismus, no JVD, trachea midline  Cardiac/CV:  S1 S2, RRR, no M/G/R  Respiratory/Pulm:  CTAB, good air movement, normal resp effort, no wheezes/stridor/retractions/rales/rhonchi  Gastrointestinal/Abdomen:  Soft, distended, no rebound/guarding, TTP LLQ  Back:  no CVAT, no MLT  Ext:  warm, well perfused, moving all extremities spontaneously, no peripheral edema, distal pulses intact  Skin: intact, no rash  Neuro:  AAOx3, sensation intact, motor 5/5 x 4 extremities, speech clear

## 2021-06-28 NOTE — ED PROVIDER NOTE - NSFOLLOWUPINSTRUCTIONS_ED_ALL_ED_FT
You have been seen in the ED today for abdominal discomfort. Your labs, urine, and CT scan today are normal.     Please follow up with your GI (gastroenterologist) doctor as soon as possible for further management.     If you develop the inability to eat or drink, difficulty breathing, chest pain, blood in your stool or urine, fevers or chills, or any other concerning symptoms please return to the ED.

## 2021-06-28 NOTE — ED PROVIDER NOTE - OBJECTIVE STATEMENT
37yo w/PMH c.diff and diverticulitis, p/w lower abdominal pain (L>R) progressively worsening over 2 weeks, a/w hematuria and abdominal distension. Pt denies fever/chills, cp/sob, n/v/d, melena/hematochezia, urinary frequency, or further symptoms. Last BM was today, not passing gas per patient. Patient states he was treated for c.diff 1 month ago with metronidazole, and followed up with a GI doctor who performed endoscopy after which he was placed on omeprazole. Patient has not taken any other medications for his symptoms. No events at onset. Patient states he feels abdominal distension worst after eating.

## 2021-06-28 NOTE — ED PROVIDER NOTE - CLINICAL SUMMARY MEDICAL DECISION MAKING FREE TEXT BOX
Pt p/w lower abdominal pain, worst LLQ in context of history of cdiff and diverticulitis. Pt also endorses hematuria, adding nephrolithiasis to the ddx. Hx of inguinal hernia repair, PBO vs. SBO possible. PE significant for abdominal distension and LLQ pain. labs, ua, CT pending. Dispo per workup. Has GI f/u.

## 2021-06-28 NOTE — ED PROVIDER NOTE - ATTENDING CONTRIBUTION TO CARE
DR. GOMEZ, ATTENDING MD-  I performed a face to face bedside interview with the patient regarding history of present illness, review of symptoms and past medical history. I completed an independent physical exam.  I have discussed the patient's plan of care with the fellow.  Documentation as above in the note.    35 y/o male h/o diverticulitis, cdiff, epiploic appendagitis here with abd pain, hematuria.  Eval for diverticulitis, uti.  Obtain cbc cmp ua ucx ct a/p reassess.

## 2021-06-28 NOTE — ED ADULT NURSE NOTE - OBJECTIVE STATEMENT
pt received at intake rm 4 AAo x 3. pt with hx of diverticulitis and c.diff. pt c/o lower abdominal pain x 1 week associated with hematuria and burning while urinating. pt denies sob, chest pain, n/v/d, fevers, chills, cough. respirations even and unlabored. 20g iv placed to left ac. will continue to monitor.

## 2021-06-28 NOTE — ED PROVIDER NOTE - PATIENT PORTAL LINK FT
You can access the FollowMyHealth Patient Portal offered by [facility] by registering at the following website: [email or web address]. By joining [employer]’s FollowMyHealth portal, you will also be able to view your health information using other applications (apps) compatible with our system.

## 2021-06-28 NOTE — ED PROVIDER NOTE - PROGRESS NOTE DETAILS
Discussed results with patient. Advised GI f/u for abdominal distension. Patient mentioned his ankles have been swollen in the AM, but resolve throughout the day. Advised pt to f/u w/PCP for these symptoms (denies CP/SOB, palpitations or near syncope). Pt also endorsing that his hematuria is intermittent (not observed in ED today). Advised patient to follow up with outpatient urology and gave handout for referral.

## 2022-02-09 NOTE — ED PROVIDER NOTE - CROS ED NEURO ALL NEG
Implemented All Universal Safety Interventions:  [place] to call system. Call bell, personal items and telephone within reach. Instruct patient to call for assistance. Room bathroom lighting operational. Non-slip footwear when patient is off stretcher. Physically safe environment: no spills, clutter or unnecessary equipment. Stretcher in lowest position, wheels locked, appropriate side rails in place.
negative...

## 2022-10-23 ENCOUNTER — EMERGENCY (EMERGENCY)
Facility: HOSPITAL | Age: 37
LOS: 1 days | Discharge: HOME | End: 2022-10-23
Attending: EMERGENCY MEDICINE | Admitting: EMERGENCY MEDICINE

## 2022-10-23 VITALS
RESPIRATION RATE: 16 BRPM | HEART RATE: 82 BPM | SYSTOLIC BLOOD PRESSURE: 124 MMHG | TEMPERATURE: 97 F | DIASTOLIC BLOOD PRESSURE: 83 MMHG | OXYGEN SATURATION: 99 %

## 2022-10-23 DIAGNOSIS — Z98.890 OTHER SPECIFIED POSTPROCEDURAL STATES: Chronic | ICD-10-CM

## 2022-10-23 PROBLEM — A04.72 ENTEROCOLITIS DUE TO CLOSTRIDIUM DIFFICILE, NOT SPECIFIED AS RECURRENT: Chronic | Status: ACTIVE | Noted: 2021-06-28

## 2022-10-23 LAB
ALBUMIN SERPL ELPH-MCNC: 4.3 G/DL — SIGNIFICANT CHANGE UP (ref 3.3–5)
ALP SERPL-CCNC: 55 U/L — SIGNIFICANT CHANGE UP (ref 40–120)
ALT FLD-CCNC: 14 U/L — SIGNIFICANT CHANGE UP (ref 4–41)
ANION GAP SERPL CALC-SCNC: 9 MMOL/L — SIGNIFICANT CHANGE UP (ref 7–14)
AST SERPL-CCNC: 14 U/L — SIGNIFICANT CHANGE UP (ref 4–40)
BILIRUB SERPL-MCNC: 0.4 MG/DL — SIGNIFICANT CHANGE UP (ref 0.2–1.2)
BUN SERPL-MCNC: 13 MG/DL — SIGNIFICANT CHANGE UP (ref 7–23)
CALCIUM SERPL-MCNC: 9.7 MG/DL — SIGNIFICANT CHANGE UP (ref 8.4–10.5)
CHLORIDE SERPL-SCNC: 99 MMOL/L — SIGNIFICANT CHANGE UP (ref 98–107)
CO2 SERPL-SCNC: 28 MMOL/L — SIGNIFICANT CHANGE UP (ref 22–31)
CREAT SERPL-MCNC: 0.94 MG/DL — SIGNIFICANT CHANGE UP (ref 0.5–1.3)
EGFR: 108 ML/MIN/1.73M2 — SIGNIFICANT CHANGE UP
GLUCOSE SERPL-MCNC: 99 MG/DL — SIGNIFICANT CHANGE UP (ref 70–99)
HCT VFR BLD CALC: 44.7 % — SIGNIFICANT CHANGE UP (ref 39–50)
HGB BLD-MCNC: 15.9 G/DL — SIGNIFICANT CHANGE UP (ref 13–17)
MCHC RBC-ENTMCNC: 29.9 PG — SIGNIFICANT CHANGE UP (ref 27–34)
MCHC RBC-ENTMCNC: 35.6 GM/DL — SIGNIFICANT CHANGE UP (ref 32–36)
MCV RBC AUTO: 84.2 FL — SIGNIFICANT CHANGE UP (ref 80–100)
NRBC # BLD: 0 /100 WBCS — SIGNIFICANT CHANGE UP (ref 0–0)
NRBC # FLD: 0 K/UL — SIGNIFICANT CHANGE UP (ref 0–0)
PLATELET # BLD AUTO: 240 K/UL — SIGNIFICANT CHANGE UP (ref 150–400)
POTASSIUM SERPL-MCNC: 3.6 MMOL/L — SIGNIFICANT CHANGE UP (ref 3.5–5.3)
POTASSIUM SERPL-SCNC: 3.6 MMOL/L — SIGNIFICANT CHANGE UP (ref 3.5–5.3)
PROT SERPL-MCNC: 7.5 G/DL — SIGNIFICANT CHANGE UP (ref 6–8.3)
RBC # BLD: 5.31 M/UL — SIGNIFICANT CHANGE UP (ref 4.2–5.8)
RBC # FLD: 12.4 % — SIGNIFICANT CHANGE UP (ref 10.3–14.5)
SARS-COV-2 RNA SPEC QL NAA+PROBE: SIGNIFICANT CHANGE UP
SODIUM SERPL-SCNC: 136 MMOL/L — SIGNIFICANT CHANGE UP (ref 135–145)
TROPONIN T, HIGH SENSITIVITY RESULT: <6 NG/L — SIGNIFICANT CHANGE UP
WBC # BLD: 6.01 K/UL — SIGNIFICANT CHANGE UP (ref 3.8–10.5)
WBC # FLD AUTO: 6.01 K/UL — SIGNIFICANT CHANGE UP (ref 3.8–10.5)

## 2022-10-23 PROCEDURE — 99285 EMERGENCY DEPT VISIT HI MDM: CPT

## 2022-10-23 PROCEDURE — 71046 X-RAY EXAM CHEST 2 VIEWS: CPT | Mod: 26

## 2022-10-23 PROCEDURE — 93010 ELECTROCARDIOGRAM REPORT: CPT

## 2022-10-23 NOTE — ED PROVIDER NOTE - NSFOLLOWUPINSTRUCTIONS_ED_ALL_ED_FT
Chest Pain    Chest pain can be caused by many different conditions which may or may not be dangerous. Causes include heartburn, lung infections, heart attack, blood clot in lungs, skin infections, strain or damage to muscle, cartilage, or bones, etc. In addition to a history and physical examination, an electrocardiogram (ECG) or other lab tests may have been performed to determine the cause of your chest pain. Follow up with your primary care provider or with a cardiologist as instructed.     SEEK IMMEDIATE MEDICAL CARE IF YOU HAVE ANY OF THE FOLLOWING SYMPTOMS: worsening chest pain, coughing up blood, unexplained back/neck/jaw pain, severe abdominal pain, dizziness or lightheadedness, fainting, shortness of breath, sweaty or clammy skin, vomiting, or racing heart beat. These symptoms may represent a serious problem that is an emergency. Do not wait to see if the symptoms will go away. Get medical help right away. Call 911 and do not drive yourself to the hospital.     Follow up with your cardiologist for a stress test and echocardiogram.

## 2022-10-23 NOTE — ED PROVIDER NOTE - OBJECTIVE STATEMENT
37 year old male asthma hx with 2 months of left chest and shoulder pain that has worsened over past 2 days. no trauma, no fever no cough, mild sob. no leg swelling. pain is constant, non exertional, achey. took no meds.  no prior stress or echo.  Mom with MI in 50's. non smoker.

## 2022-10-23 NOTE — ED PROVIDER NOTE - CLINICAL SUMMARY MEDICAL DECISION MAKING FREE TEXT BOX
concern for acs vs msk pain vs pna vs pleural effusion vs ptx.  well's score below testing threshold for pe.  trop ekg, cbc cmp for electrolyte abn.  will offer cardiac testing if workup neg

## 2022-10-23 NOTE — ED ADULT NURSE NOTE - OBJECTIVE STATEMENT
Received pt to intake 5, A+Ox4, ambulatory. C/O left sided chest pain radiating to the left shoulder, upper back. Respirations even and unlabored, normal work of breathing, no accessory muscle use, speaking in full clear uninterrupted sentences. ABD is soft, non tender, non distended. Pt denies any SOB, headache, dizziness, N/V/D, fever, chills. . 20G to LAC, Labs sent, will continue to monitor.

## 2023-03-15 NOTE — ED ADULT NURSE NOTE - NSSEPSISSUSPECTED_ED_A_ED
Pt. received from previous nurse in stable condition,  at bedside advocating for pt.  No s/sx of resp. distress/pain.  O2 in place via NC.  Safety maintained, call bell within reach, will continue to monitor.
Purposeful rounding done. PT assisted by assigned RN and myself with repositioning and placed in gown. Pt awaiting bed assignment. No other questions or needs at this time. Call bell in reach.
No

## 2023-05-09 NOTE — ED PROVIDER NOTE - DISPOSITION TYPE
Chief Complaint   Patient presents with    Elbow Pain     left       History obtained from mother.    HPI: Neli Nathan is a 15 y.o. child here for evaluation of left elbow pain that started 2 weeks ago.  Patient states it was her left elbow and left forearm and it worsened when she bent her elbow or twisted her hand.  She went to urgent care and x-rays were negative.  She was advised that if it did not resolve in 2 weeks to return for re-evaluation so she is here.  It has improved greatly.  No numbness or tingling.  She denies any injury.      Review of Systems   Musculoskeletal:  Positive for myalgias.   Neurological:  Negative for tingling, sensory change and weakness.      Current Outpatient Medications on File Prior to Visit   Medication Sig Dispense Refill    ofloxacin (FLOXIN) 0.3 % otic solution Place into the right ear.       No current facility-administered medications on file prior to visit.       Patient Active Problem List   Diagnosis    Bite, insect    Infected insect bite    Allergic reaction to insect bite    Congenital pes planovalgus    Allergic rhinitis    Mandibular hyperplasia            Past Medical History:   Diagnosis Date    Anxiety     PTSD (post-traumatic stress disorder)      Past Surgical History:   Procedure Laterality Date    ADENOIDECTOMY      TYMPANOSTOMY TUBE PLACEMENT        Social History     Social History Narrative    Going to [employer] [facility].No pets. Lives with mom and step dad, 2 brothers (7, and 7 yrs old).      Family History   Problem Relation Age of Onset    No Known Problems Mother     No Known Problems Father     No Known Problems Sister     Asthma Brother     No Known Problems Maternal Grandmother     No Known Problems Maternal Grandfather     No Known Problems Paternal Grandmother     No Known Problems Paternal Grandfather           EXAM:  Vitals:    05/08/23 1415   Resp: 20   Temp: 98.6 °F (37 °C)     Temp 98.6 °F (37 °C) (Oral)   Resp 20   Wt  68.7 kg (151 lb 7.3 oz)   LMP 04/17/2023   General appearance: alert, appears stated age, and cooperative  Extremities: extremities normal, atraumatic, no cyanosis or edema        IMPRESSION  1. Left forearm pain            PLAN  Neli was seen today for elbow pain.    Diagnoses and all orders for this visit:    Left forearm pain    Tylenol or ibuprofen as directed.  Ice area as needed.  Rest.  Avoid any turning or twisting movements of left hand.  Notify clinic if symptoms do not improve in the next 2 weeks.   DISCHARGE

## 2023-09-28 NOTE — ED PROVIDER NOTE - ALLERGIC/IMMUNOLOGIC NEGATIVE STATEMENT, MLM
no dermatitis, no environmental allergies, no food allergies, no immunosuppressive disorder, and no pruritus. Protopic Pregnancy And Lactation Text: This medication is Pregnancy Category C. It is unknown if this medication is excreted in breast milk when applied topically.

## 2024-08-05 NOTE — ED PROVIDER NOTE - CROS ED RESP ALL NEG
"  Assessment & Plan     ICD-10-CM    1. Syncope, unspecified syncope type  R55 Magnesium     CBC with platelets and differential      2. Need for hepatitis C screening test  Z11.59       3. Pancreatic lesion  K86.9       4. Primary hypertension  I10 Comprehensive metabolic panel (BMP + Alb, Alk Phos, ALT, AST, Total. Bili, TP)      5. Hypokalemia  E87.6 Comprehensive metabolic panel (BMP + Alb, Alk Phos, ALT, AST, Total. Bili, TP)      6. Low magnesium level  R79.0 magnesium 250 MG tablet      7. Alcohol use  Z78.9 thiamine (B-1) 100 MG tablet     Folate        Patient is here today for follow-up of emergency room visit for a fall.  She has considerable bruising all over.  Reviewed labs and workup done in emergency room.  Will check electrolytes again.  May need to start on supplement.  Needs close follow-up.      MED REC REQUIRED  Post Medication Reconciliation Status:  Discharge medications reconciled and changed, see notes/orders  BMI  Estimated body mass index is 36.6 kg/m  as calculated from the following:    Height as of this encounter: 1.626 m (5' 4\").    Weight as of this encounter: 96.7 kg (213 lb 3.2 oz).         MEDICATIONS:   Orders Placed This Encounter   Medications    thiamine (B-1) 100 MG tablet     Sig: Take 1 tablet (100 mg) by mouth daily     Dispense:  100 tablet     Refill:  3    magnesium 250 MG tablet     Sig: Take 1 tablet (250 mg) by mouth daily     Dispense:  100 tablet     Refill:  1          - Continue other medications without change  Patient Instructions   I have seen you today for follow-up of emergency room visit for syncope.    We are taking your blood count, electrolytes including potassium and magnesium today.    Noting you have a large red blood cell size, regular alcohol use, I am starting you on thiamine and folic acid.  I strongly advised her to cut down on alcohol slowly to not more than 1 drink a day and not more than 7 in 1 week.  These are general guidelines.    Noting low " magnesium, start magnesium supplement prescription is sent to the pharmacy.  It may not be covered with insurance and you may need to buy OTC.    Other issues that need follow-up-    Neurology appointment.  Imaging studies for follow-up of pancreatic lesion/cyst    Follow-up with your PCP regularly so that multiple issues can be followed.    Total time spent including chart review, patient interview, discussion of plan of care, examination and documentation exceeded 40 minutes.      Tien Bradley is a 78 year old, presenting for the following health issues:  Hospital F/U ([facility] follow up- fall. )        8/5/2024     8:52 AM   Additional Questions   Roomed by Sara Saenz CMA   Accompanied by Spouse     History of Present Illness       Heart Failure:  She presents for follow up of heart failure. She is not experiencing shortness of breath at night, with rest or with activity  She is experiencing lower extremity edema which is worse than usual.   She has orthopenea and is not coughing at night. Patient is checking weight daily. She has recently had a weight decrease.  She has no side effects from medications.  She has has a medical visit for heart failure 1 time since the last visit.    She eats 2-3 servings of fruits and vegetables daily.She exercises with enough effort to increase her heart rate 9 or less minutes per day.  She exercises with enough effort to increase her heart rate 3 or less days per week.      Last Echo:   Echo result w/o MOPS: Interpretation Summary 1. Normal left ventricular size and systolic performance with a visuallyestimated ejection fraction of 65%.2. No significant valvular heart disease is identified on this study.3. Normal right ventricular size and systolic performance.4. There is mild left atrial enlargement. When compared to the prior real-time echocardiogram dated 12 May 2024, thepreviously identified small pericardial effusion is no longer appreciated. Thefindings are  otherwise felt to be fairly similar on both examinations.        Hospital Follow-up Visit:    Hospital/Nursing Home/IP Rehab Facility: [facility]  Date of Admission: 07/31/2024  Date of Discharge: 08/02/2024  Reason(s) for Admission: Fall  Was the patient in the ICU or did the patient experience delirium during hospitalization?  No  Do you have any other stressors you would like to discuss with your provider? No    Problems taking medications regularly:  None  Medication changes since discharge: Yes  Problems adhering to non-medication therapy:  None    Summary of hospitalization:  [facility] discharge summary reviewed  Diagnostic Tests/Treatments reviewed.  Follow up needed: Needs lab and follow-up done  Other Healthcare Providers Involved in Patient s Care:         Specialist appointment - I do see that she is following with cardiology and endocrinology and urology.  An appointment with neurology appears to be canceled  Update since discharge: improved.         Plan of care communicated with patient           Patient Active Problem List   Diagnosis    Astigmatism    Nuclear senile cataract    Primary open angle glaucoma (POAG) of both eyes, mild stage    Class 2 obesity    Hypertensive urgency    Class 2 severe obesity due to excess calories with serious comorbidity in adult (H)    Vision loss of right eye    AFX (amaurosis fugax)    Acute pulmonary edema (H)    Acute respiratory failure with hypoxia (H)    Acute heart failure with preserved ejection fraction (H)    Tachycardia    Chronic bilateral low back pain with left-sided sciatica    Multiple pulmonary nodules, repeat chest CT 6/20/2024    Primary hypertension    Pseudophakia, iStent, os    Combined forms of age-related cataract, mild-mod, of right eye    Nevus of choroid of right eye    TIA (transient ischemic attack)    Postoperative hypothyroidism    Hypokalemia     Current Outpatient Medications   Medication Sig  Dispense Refill    acetaminophen (TYLENOL) 500 MG tablet Take 1 tablet (500 mg) by mouth every 6 hours as needed for mild pain 60 tablet 1    aspirin 81 MG EC tablet Take 1 tablet (81 mg) by mouth daily 90 tablet 2    atorvastatin (LIPITOR) 80 MG tablet Take 1 tablet (80 mg) by mouth every evening 90 tablet 0    bimatoprost (LUMIGAN) 0.01 % SOLN Place 1 drop into the right eye at bedtime 7.5 mL 3    bumetanide (BUMEX) 2 MG tablet Take 1 tablet (2 mg) by mouth daily 90 tablet 0    Cranberry-Vitamin C (CRANBERRY CONCENTRATE/VITAMINC) 69883-264 MG CAPS Take 2 capsules by mouth daily      empagliflozin (JARDIANCE) 10 MG TABS tablet Take 1 tablet (10 mg) by mouth daily 90 tablet 1    levothyroxine (SYNTHROID/LEVOTHROID) 137 MCG tablet Take 1 tablet (137 mcg) by mouth daily 90 tablet 0    magnesium 250 MG tablet Take 1 tablet (250 mg) by mouth daily 100 tablet 1    Melatonin 10-10 MG TBCR Take 1 tablet by mouth at bedtime      metoprolol tartrate (LOPRESSOR) 25 MG tablet Take 1 tablet (25 mg) by mouth 2 times daily 180 tablet 1    oxyCODONE (ROXICODONE) 5 MG tablet Take 1 tablet (5 mg) by mouth every 4 hours as needed for severe pain 16 tablet 0    polyethylene glycol (MIRALAX) 17 GM/Dose powder Take 17 g by mouth daily 510 g 0    [START ON 8/16/2024] sertraline (ZOLOFT) 25 MG tablet Take 1 tablet (25 mg) by mouth daily 90 tablet 0    spironolactone (ALDACTONE) 25 MG tablet Take 0.5 tablets (12.5 mg) by mouth 2 times daily 30 tablet 1    thiamine (B-1) 100 MG tablet Take 1 tablet (100 mg) by mouth daily 100 tablet 3     Current Facility-Administered Medications   Medication Dose Route Frequency Provider Last Rate Last Admin    0.5 mL ropivacaine (NAROPIN) injection 5 mg/mL  0.5 mL      0.5 mL at 07/25/24 1412    0.5 mL ropivacaine (NAROPIN) injection 5 mg/mL  0.5 mL      0.5 mL at 07/25/24 1412    betamethasone acet & sod phos (CELESTONE) injection 6 mg  6 mg      6 mg at 07/25/24 1412    betamethasone acet & sod phos  "(CELESTONE) injection 6 mg  6 mg      6 mg at 07/25/24 1412           Review of Systems  Constitutional, HEENT, cardiovascular, pulmonary, GI, , musculoskeletal, neuro, skin, endocrine and psych systems are negative, except as otherwise noted.      Objective    /70   Pulse 91   Resp 20   Ht 1.626 m (5' 4\")   Wt 96.7 kg (213 lb 3.2 oz)   LMP  (LMP Unknown)   SpO2 96%   BMI 36.60 kg/m    Body mass index is 36.6 kg/m .  Physical Exam   GENERAL: alert and elderly  NECK: no adenopathy, no asymmetry, masses, or scars  RESP: lungs clear to auscultation - no rales, rhonchi or wheezes  CV: regular rates and rhythm and no murmur, click or rub  SKIN: Noted extensive bruising    Admission on 07/31/2024, Discharged on 08/02/2024   Component Date Value Ref Range Status    Hold Specimen 07/31/2024 [facility]   Final    Hold Specimen 07/31/2024 [facility]   Final    Hold Specimen 07/31/2024 [facility]   Final    Hold Specimen 07/31/2024 [facility]   Final    Sodium 07/31/2024 139  135 - 145 mmol/L Final    Potassium 07/31/2024 2.6 (LL)  3.4 - 5.3 mmol/L Final    Chloride 07/31/2024 91 (L)  98 - 107 mmol/L Final    Carbon Dioxide (CO2) 07/31/2024 32 (H)  22 - 29 mmol/L Final    Anion Gap 07/31/2024 16 (H)  7 - 15 mmol/L Final    Urea Nitrogen 07/31/2024 13.6  8.0 - 23.0 mg/dL Final    Creatinine 07/31/2024 0.65  0.51 - 0.95 mg/dL Final    GFR Estimate 07/31/2024 >90  >60 mL/min/1.73m2 Final    eGFR calculated using 2021 CKD-EPI equation.    Calcium 07/31/2024 9.2  8.8 - 10.4 mg/dL Final    Reference intervals for this test were updated on 7/16/2024 to reflect our healthy population more accurately. There may be differences in the flagging of prior results with similar values performed with this method. Those prior results can be interpreted in the context of the updated reference intervals.    Glucose 07/31/2024 169 (H)  70 - 99 mg/dL Final    Troponin T, High Sensitivity 07/31/2024 29 (H)  <=14 ng/L Final    Either a High Sensitivity Troponin T " baseline (0 hours) value = 100 ng/L, or an increase in High Sensitivity Troponin T = 7 ng/L at 2 hours compared to 0 hours (2-0 hours), suggests myocardial injury, and urgent clinical attention is required.    If the 2-0 hours increase is <7 ng/L, a High Sensitivity Troponin T result above gender-specific reference ranges warrants further evaluation.   Recommendations for further evaluation include correlation with clinical decision-making tool (e.g., HEART), a 3rd High Sensitivity Troponin T test 2 hours after the 2nd (a 20% change from baseline would represent concern), admission for observation, close PCC/cardiology follow-up, or urgent outpatient provocative testing.    WBC Count 07/31/2024 9.3  4.0 - 11.0 10e3/uL Final    RBC Count 07/31/2024 4.01  3.80 - 5.20 10e6/uL Final    Hemoglobin 07/31/2024 14.6  11.7 - 15.7 g/dL Final    Hematocrit 07/31/2024 40.6  35.0 - 47.0 % Final    MCV 07/31/2024 101 (H)  78 - 100 fL Final    MCH 07/31/2024 36.4 (H)  26.5 - 33.0 pg Final    MCHC 07/31/2024 36.0  31.5 - 36.5 g/dL Final    RDW 07/31/2024 14.6  10.0 - 15.0 % Final    Platelet Count 07/31/2024 183  150 - 450 10e3/uL Final    % Neutrophils 07/31/2024 76  % Final    % Lymphocytes 07/31/2024 14  % Final    % Monocytes 07/31/2024 9  % Final    % Eosinophils 07/31/2024 1  % Final    % Basophils 07/31/2024 1  % Final    % Immature Granulocytes 07/31/2024 1  % Final    NRBCs per 100 WBC 07/31/2024 0  <1 /100 Final    Absolute Neutrophils 07/31/2024 7.0  1.6 - 8.3 10e3/uL Final    Absolute Lymphocytes 07/31/2024 1.3  0.8 - 5.3 10e3/uL Final    Absolute Monocytes 07/31/2024 0.8  0.0 - 1.3 10e3/uL Final    Absolute Eosinophils 07/31/2024 0.1  0.0 - 0.7 10e3/uL Final    Absolute Basophils 07/31/2024 0.1  0.0 - 0.2 10e3/uL Final    Absolute Immature Granulocytes 07/31/2024 0.1  <=0.4 10e3/uL Final    Absolute NRBCs 07/31/2024 0.0  10e3/uL Final    N terminal Pro BNP Inpatient 07/31/2024 339  0 - 1,800 pg/mL Final    Reference  range shown and results flagged as abnormal are suggested inpatient cut points for confirming diagnosis if CHF in an acute setting. Establishing a baseline value for each individual patient is useful for follow-up. An inpatient or emergency department NT-proPBNP <300 pg/mL effectively rules out acute CHF, with 99% negative predictive value.    The outpatient non-acute reference range for ruling out CHF is:  0-125 pg/mL (age 18 to less than 75)  0-450 pg/mL (age 75 yrs and older)     Magnesium 07/31/2024 1.9  1.7 - 2.3 mg/dL Final    Troponin T, High Sensitivity 07/31/2024 25 (H)  <=14 ng/L Final    Either a High Sensitivity Troponin T baseline (0 hours) value = 100 ng/L, or an increase in High Sensitivity Troponin T = 7 ng/L at 2 hours compared to 0 hours (2-0 hours), suggests myocardial injury, and urgent clinical attention is required.    If the 2-0 hours increase is <7 ng/L, a High Sensitivity Troponin T result above gender-specific reference ranges warrants further evaluation.   Recommendations for further evaluation include correlation with clinical decision-making tool (e.g., HEART), a 3rd High Sensitivity Troponin T test 2 hours after the 2nd (a 20% change from baseline would represent concern), admission for observation, close PCC/cardiology follow-up, or urgent outpatient provocative testing.    Potassium 07/31/2024 2.4 (LL)  3.4 - 5.3 mmol/L Final    Potassium 08/01/2024 3.2 (L)  3.4 - 5.3 mmol/L Final    Sodium 08/01/2024 137  135 - 145 mmol/L Final    Potassium 08/01/2024 3.3 (L)  3.4 - 5.3 mmol/L Final    Chloride 08/01/2024 96 (L)  98 - 107 mmol/L Final    Carbon Dioxide (CO2) 08/01/2024 30 (H)  22 - 29 mmol/L Final    Anion Gap 08/01/2024 11  7 - 15 mmol/L Final    Urea Nitrogen 08/01/2024 10.1  8.0 - 23.0 mg/dL Final    Creatinine 08/01/2024 0.57  0.51 - 0.95 mg/dL Final    GFR Estimate 08/01/2024 >90  >60 mL/min/1.73m2 Final    eGFR calculated using 2021 CKD-EPI equation.    Calcium 08/01/2024 8.6  (L)  8.8 - 10.4 mg/dL Final    Reference intervals for this test were updated on 7/16/2024 to reflect our healthy population more accurately. There may be differences in the flagging of prior results with similar values performed with this method. Those prior results can be interpreted in the context of the updated reference intervals.    Glucose 08/01/2024 167 (H)  70 - 99 mg/dL Final    GLUCOSE BY METER POCT 08/01/2024 104 (H)  70 - 99 mg/dL Final    Magnesium 08/01/2024 2.3  1.7 - 2.3 mg/dL Final    Phosphorus 08/01/2024 2.1 (L)  2.5 - 4.5 mg/dL Final    Hold Specimen 08/01/2024 [facility]   Final    Potassium 08/01/2024 3.7  3.4 - 5.3 mmol/L Final    GLUCOSE BY METER POCT 08/01/2024 123 (H)  70 - 99 mg/dL Final    Phosphorus 08/02/2024 3.5  2.5 - 4.5 mg/dL Final    Sodium 08/02/2024 141  135 - 145 mmol/L Final    Potassium 08/02/2024 3.3 (L)  3.4 - 5.3 mmol/L Final    Chloride 08/02/2024 98  98 - 107 mmol/L Final    Carbon Dioxide (CO2) 08/02/2024 31 (H)  22 - 29 mmol/L Final    Anion Gap 08/02/2024 12  7 - 15 mmol/L Final    Urea Nitrogen 08/02/2024 9.6  8.0 - 23.0 mg/dL Final    Creatinine 08/02/2024 0.62  0.51 - 0.95 mg/dL Final    GFR Estimate 08/02/2024 >90  >60 mL/min/1.73m2 Final    eGFR calculated using 2021 CKD-EPI equation.    Calcium 08/02/2024 8.5 (L)  8.8 - 10.4 mg/dL Final    Reference intervals for this test were updated on 7/16/2024 to reflect our healthy population more accurately. There may be differences in the flagging of prior results with similar values performed with this method. Those prior results can be interpreted in the context of the updated reference intervals.    Glucose 08/02/2024 108 (H)  70 - 99 mg/dL Final    Hemoglobin 08/02/2024 12.6  11.7 - 15.7 g/dL Final    Potassium 08/02/2024 3.5  3.4 - 5.3 mmol/L Final           Signed Electronically by: Cathryn Chase MD     - - -

## 2025-03-04 NOTE — ED PROVIDER NOTE - CARDIOVASCULAR [-], MLM
PT Skilled Maintenance Note / UPDATE    Today's date: 3/4/2025  Patient name: Yuni Hall  : 1956  MRN: [id number]  Referring provider: Arnie Cadena DO  Dx:   Encounter Diagnosis     ICD-10-CM    1. Fall, subsequent encounter  W19.XXXD           Subjective:   Had fall since LV on ice in parking lot. R shoulder is bothering her now. Can't flex shoulder w/ elbow bent and has pain w/ horizontal adduction. Pain located in posterior shoulder.   Continues w/ R knee pain. States that B hamstrings and calves are constantly tight. Would like new stretches for these areas.  Elbow has improved.   Had such intense pain 1-2 weeks ago that was constantly vomiting and unable to keep food down. Pain has improved to baseline since this.   Has pressure points, physician recommended ice and ibuprofen. Has been using tennis ball to help w/ releasing these, tolerating well.        Objective: See treatment diary below  Shoulder flexion w/ elbow straight full AROM   Shoulder flexion w/ elbow bent to 90 degrees    AROM 92 degrees (painful)   PROM approx. 90 degrees (painful)  Horizontal adduction full AROM but painful  TTP over superior angle of scap    Shoulder MMT:    Flexion: R: 5/5 L: 5/5   Shoulder abduction R: 5/5 L: 5/5   Shoulder ER arm abd to 90 R: 4/5 (? Pain) L: 4+/5   Shoulder IR arm abd to 90 R: 4+/5 L4+/5      Plan: Continue per plan of care.    Specialty Daily Treatment Diary   Exercise Diary  1/28/25 3/4/25 12/3/24 11/12/24 10/29/24 10/24/24 10/09/24 9/18/24 6/04/24 8/21/24 7/31/24 7/23/24 7/02/24 6/19/24 6/05/24 5/22/24   Static and dynamic balance (neuromuscular re-education)                   Strengthening & endurance  (therapeutic exercise) Outcomes below    Seated isometric pronation / supination  1 min with towel    Wrist flexion, 3 lbs, about 15-20 right    Wrist extension stretching Outcomes below      Shoulder stretch at counter   1min total, gentle rocking     TB shoulder extension w/ emphasis on  "posture and scap depression   Black x30 reps        Scifit: level 2.5, 10 minutes     Sit to stand 18\" surface  10 x 2 sets    Seated right elbow flexion, 3 lbs, 10    Seated elbow extension stretch elbow supination, 45 sec x 2 sets    Wrist towel  30 sec x 2 sets    Scapular row and shoulder extension   Doubled therabands about 45 sec each bilateral    Step ups 6\"   Lateral   About 10 each x 2 sets  Standing heel raise, 5 bilaterally  Then unilaterally    Walking 100 feet laps timed- deferred to next visit         Right wrist flexion 2 lbs 15 x 2 sets  Right elbow flexion 2 lbs, Limited range to avoid pain 20 x 2 sets  Isometric wrist rolls/wringing towel 30 sec x 2 sets  Pronation / supination with 1 lb bar, 30 sec x 2 sets    SciFit level 2.4, seat 21, 10 min       True treadmill   1.0 mph 5.5 min   Seated knee flexion stretch  Dorsiflexion/plantarflexion stretch    R lateral step up 6\" step 10 each  Forwards 2 rails right 6\" step about 10     Bilateral heel raise  Single leg about 5 on right, about 10 on left    Seated knee flexion stretch   Sit to stand 18\"   10   10    Fast timed walking 100 feet  29.3 sec  30.5 sec  30 sec    Doing some finger flexion and      Biodex treadmill   1.0 mph 2 min  1.2 mph 6 min 13-14/20 RPE    Sleeper stretch R arm  2 min    Left UT stretch    Banded L shoulder ER 10 x 2 sets    Lateral step to 6\" step  10 each x 2 sets  Standing knee flexion AROM 30 sec each  Heel raise single leg    Fast timed walking 100 feet  32 sec  30 sec  28 sec    Scifit level 2.5 - 2.1   8 min   Biodex treadmill   0.9 mph 2 min  1.2 mph 1 min  0.9 mph 1 min  1.2 mph 1 min  0.9 mph 1 min  1.2 mph 1 min  0.9 mph 1 min    Shoulder ER banded red band  15  Shoulder elevation with external rotation red band 10  Sleeper stretch R arm 2.5 min     Biodex treadmill   1.1 mph 12 min    Right elbow flexion, 3 lbs 15 x 2 sets    Sit to stand 18\" surface  10  16.5\" surface  5 x 3 sets    Standing heel raise, 10 x " "2 sets right  5-10 left x 2 sets    Step up R leading about 10   Lateral, 10     Fast timed walking 100 feet  31 sec  30 sec    SciFit level   Legs only   12 min  About 90 spm       Thoracic rotation stretches  Seated stretch using edge of chair, 20-30 sec x 2-3 each    Supine trunk rotation stretch, 30 sec each     Modified plantigrade standing hands on plinth, thread the needle trunk rotation stretch  20 sec x 2 each    Sit to stand 16.5\" surface 5-10 x 2 sets  Standing heel raise, about 5-10 each x 2 sets  Single leg    Lateral step to 6\" step 4-10 each x 2 sets  Standing hip abduction, green band, about 10 each x 2 sets    SciFit level 2.5 11 min about 110 spm   Treadmill 0.8 mph - 1.2 mph 12 min total    Heel raise, incline  10 x 2 sets  3-4 single leg each    Step up 6\" step  About 10 each without use of hands,   1 hand laterally 10 each    Sit to stand 20\" surface, 10  19\" surface, 10    Demonstrates R ulnar nerve stretching / flossing    High stepping 1 min  Braiding walking 1 min     Treadmill to 8 min   1.0 mph - 1.2 mph     Sit to stand no hands 18\" chair    Up and down 4\" stair steps x 3 about 5 times  Same 6\" stair steps x 2-3 times, focus to keep toes forwards    Standing knee extension and seated knee flexion and gastroc stretches 3 min total    Fast timed walking best about 35 sec per 100 feet lap x 3 sets    Seated R ulnar nerve stretching/ flossing 2 min               Treadmill  Level 1 - 2 min.  Level 1.4 - 6 min.  Level 0.8 - 1 min.  Total distance: 0.19 mi.      Sit to Stand, with arms out in front   5 x2      Standing heel raises  20sec. X 2     Braided walking, slow in parallel bars  - reports ankle pain (R>L)      Hip abduction monster walks / lateral walking with slight knee bend due to limited R knee tolerance    Ulnar nerve glides  Hold to tolerance (about 2-5 sec.), rest repeat     Supine bridges   5x 2    Supine quad stretch off table  R side    SciFit level 2.1 about 100 spm  5 " "min    Testing above    R leading step up 6\" step, about 1 min, 2 rails x 2 sets    Standing heel raise, increased right weight bearing, 30 x 2 sets    Sit to stand 20\" surface   5 x 3 sets    Braiding walking, slow, 40 sec x 2  Can cross midline    40 lbs right  Barry position 2  58 lbs left   Gentle gripping towel about 1 min  Wrist rolls into flexion, 2 lbs,          SciFit level 1 about 80 spm 5 min     Overground walking 100 feet timed  37 - 44 sec x 4 sets    Standing knee stretches reaching back to heel or foot on step    Heel raise single leg heel raise about 10 each x 2 sets    5 times sit to stand   17\" surface  15.5 sec  + 7 lb ball out front 20.0 sec  + 7 lb ball against body    Standing with foot on floor slider, hip circles, leg raise, 1.5 min  Standing hip abduction, red band 1.5 min   Outcome measures    Standing knee extension stretch from seated knee extension/hamstring stretch 1 min  Standing heel raises double towards single leg 1.5 min  SciFit level 1, 10 min legs only   Treadmill 1.6 mph 5 min    Heel raise single leg 5-7 reps each x 2 sets  Step ups 6\" step nearby railing  About 10 x 2 sets each  Sit to stand about 10 x 2 sets standard 18\" chair    Braiding walking in parallel bars, 1 min x 2 sets    SciFit level 1,  spm, 10 min                                               PHYSICAL EXAM / TREATMENT    Precautions - Hashimoto's, autoimmune disease    Mobility Measures 1/28/25 3/4 10/09/24 9/04/24 7/23/24 7/02/24 6/05/24 5/1/24 3/27/24 2/28/24 1/31/24 8/23/23 9/20/23 10/18/23 11/22/23   Assistive device used? none None  none none none none none none none None none none none none none   5 Time Sit to Stand  (17\" chair, arms across chest) 18\" chair  36 seconds   18\" chair  19.14 sec 23.1 seconds   18\" chair   18.4 sec  18\" chair arms out front 29 sec.    19 sec. With arms out in front  21.6 seconds  From 18\" surface  Arms out front Unable from 18\" surface today    19.5 seconds from " "20\" surface   16.4 seconds arms out front  Arms out front  24 seconds Arms out front   A couple extra attempts  29 second- 52 seconds 31 seconds Arms out front  24 seconds  Pain in left knee   19 seconds  From 20\" surface, arms across chest    28 seconds from 18\" surface 35 seconds arms out front    15 seconds 20\" surface  Arms out front     3 Meter Timed  Up & Go 15.0 seconds 10.38 sec 9.3 sec 8.0 sec  12.3 sec 13.3 sec 9.2 sec 10.5 seconds 12.3 seconds 9.8 sec 10.6 sec   11.8 sec  Mild antalgic gait on left   10.1 seconds 9.6 seconds   Walking speed                  Functional Gait Assessment (see below)  19/30 19/30 16/30 17/30 21/30 17/30 21/30 20/30 20/30   6 Minute Walk Test (100 foot circular course)   810 feet   No assistive device   Hold, nausea  770 feet   No AD 1012 feet  No AD   800 feet no AD   940 feet no  feet 908 feet  No AD  103 bpm 1005 feet no  feet no  feet on treadmill inferred from self-selected pace 1.2 mph  875 feet no AD  Tired in legs  98 bpm   925 feet no AD   Patient-Reported Outcome Measure: Activities-Specific Balance Confidence Scale (ABC Scale)                  Right knee flexion PROM, sitting 94 degrees from seated on plinth 93 degrees while seated in chair 93 degrees 97 degrees    98 degrees   92 degrees 94 degrees 93 degrees 95 degrees                            ASSESSMENT   Discussed using ball for TPR in posterior things as has been tolerated well and effective for shoulders.  Given new stretches for R shoulder pain and cues for proper form/posture w/ TB exercises she is currently completing at home to reinforce good scapular position w/ movement.   Was using cross-country skiier but legs wouldn't bend enough to get on this. Continuing to hold on this.  Continue working towards exercise videos, currently they are too fast.    Will return for f/u w/ primary PT next week. Arrived on wrong day for monthly f/u, was offered to be seen by this PT or wait until " next week. Accepted accomodation but would like to see primary PT next week too.     New maintenance goals:  1. Walk 15 minutes consecutively for community ambulation and exercise.       Walking to tolerance, which can vary by day.  Walking up to about 30 minutes with intermittent standing.  Walking around grocery store.    2. Manages 150 minutes of moderate intensity aerobic exercise per week.        Depends on day/week.  Not met for past couple weeks. Is focusing on physical activity.    3. Maintains at least 1000 feet during 6 minute walk test, at most 15 seconds 5 times sit to stand.       NOT MET.      PLAN OF CARE:  Patient will benefit from physical therapy 1x/week to 1x/ 1-2 weeks for 2 months  Neuromuscular re-education, therapeutic exercises, and therapeutic activities as outlined in grids.     no palpitations/no peripheral edema/no syncope